# Patient Record
Sex: FEMALE | Race: WHITE | NOT HISPANIC OR LATINO | Employment: OTHER | URBAN - METROPOLITAN AREA
[De-identification: names, ages, dates, MRNs, and addresses within clinical notes are randomized per-mention and may not be internally consistent; named-entity substitution may affect disease eponyms.]

---

## 2017-02-27 ENCOUNTER — OFFICE VISIT (OUTPATIENT)
Dept: FAMILY MEDICINE CLINIC | Facility: CLINIC | Age: 57
End: 2017-02-27

## 2017-02-27 VITALS
DIASTOLIC BLOOD PRESSURE: 78 MMHG | HEIGHT: 64 IN | BODY MASS INDEX: 28.51 KG/M2 | SYSTOLIC BLOOD PRESSURE: 106 MMHG | TEMPERATURE: 98.2 F | OXYGEN SATURATION: 94 % | HEART RATE: 85 BPM | WEIGHT: 167 LBS

## 2017-02-27 DIAGNOSIS — J10.1 INFLUENZA A: Primary | ICD-10-CM

## 2017-02-27 DIAGNOSIS — H93.11 TINNITUS, RIGHT EAR: ICD-10-CM

## 2017-02-27 DIAGNOSIS — J34.81 NASAL MUCOSITIS (ULCERATIVE): ICD-10-CM

## 2017-02-27 LAB
EXPIRATION DATE: ABNORMAL
FLUAV AG NPH QL: POSITIVE
FLUBV AG NPH QL: NEGATIVE
INTERNAL CONTROL: ABNORMAL
Lab: ABNORMAL

## 2017-02-27 PROCEDURE — 87804 INFLUENZA ASSAY W/OPTIC: CPT | Performed by: FAMILY MEDICINE

## 2017-02-27 PROCEDURE — 99213 OFFICE O/P EST LOW 20 MIN: CPT | Performed by: FAMILY MEDICINE

## 2017-02-27 RX ORDER — OSELTAMIVIR PHOSPHATE 75 MG/1
75 CAPSULE ORAL 2 TIMES DAILY
Qty: 10 CAPSULE | Refills: 0 | Status: SHIPPED | OUTPATIENT
Start: 2017-02-27 | End: 2017-03-09

## 2017-02-27 RX ORDER — LEVOTHYROXINE SODIUM 0.1 MG/1
112 TABLET ORAL DAILY
COMMUNITY
End: 2017-11-29

## 2017-02-27 RX ORDER — BENZONATATE 100 MG/1
100 CAPSULE ORAL 3 TIMES DAILY PRN
Qty: 15 CAPSULE | Refills: 1 | Status: SHIPPED | OUTPATIENT
Start: 2017-02-27 | End: 2017-03-09

## 2017-02-27 NOTE — PROGRESS NOTES
"Subjective   Laxmi Mccartney is a 57 y.o. female.     Cough   This is a new problem. The current episode started in the past 7 days. The problem has been unchanged. The cough is productive of purulent sputum. Associated symptoms include chills, a fever (low grade), headaches, postnasal drip and shortness of breath (with exertion). Pertinent negatives include no myalgias or sore throat. The symptoms are aggravated by lying down. She has tried OTC cough suppressant for the symptoms.        The following portions of the patient's history were reviewed and updated as appropriate: allergies, current medications, past social history and problem list.    Review of Systems   Constitutional: Positive for chills and fever (low grade).   HENT: Positive for postnasal drip and tinnitus. Negative for sore throat.         Sore lesion in right nostril     Respiratory: Positive for cough and shortness of breath (with exertion).    Gastrointestinal: Negative for nausea.   Musculoskeletal: Negative for myalgias.   Neurological: Positive for headaches.       Objective   Visit Vitals   • /78   • Pulse 85   • Temp 98.2 °F (36.8 °C) (Oral)   • Ht 63.5\" (161.3 cm)   • Wt 167 lb (75.8 kg)   • SpO2 94%   • BMI 29.12 kg/m2     Physical Exam   Constitutional: She appears well-developed and well-nourished.   HENT:   Right Ear: External ear normal.   Left Ear: External ear normal.   Mouth/Throat: Oropharynx is clear and moist.   Right ear tube in place    Very superficial ulcer of the mucosa of the lateral aspect of the right nostril with some erythema.     Eyes: Pupils are equal, round, and reactive to light.   Neck: Normal range of motion. Neck supple.   Cardiovascular: Normal rate, regular rhythm and normal heart sounds.    No murmur heard.  Pulmonary/Chest: Effort normal and breath sounds normal. She has no wheezes. She has no rales.   Nursing note and vitals reviewed.      Assessment/Plan   Problem List Items Addressed This Visit     " None      Visit Diagnoses     Influenza A    -  Primary    Relevant Medications    mupirocin (BACTROBAN) 2 % ointment    oseltamivir (TAMIFLU) 75 MG capsule    benzonatate (TESSALON) 100 MG capsule    Tinnitus, right ear        Nasal mucositis (ulcerative)        right nostril                Rest and drink plenty fluids.    Rx for Tamiflu 75 mg twice a day for 5 days #10+0.  Rx for Benzonatate 100 mg three times a day as needed for cough #15+1.    Rx for Mupirocin ointment to apply twice a day #22 GM+0.    Follow up as needed.      Scribed for Dr Babatunde Kidd by Jyoti Herrera CMA.    I, Babatunde Kidd MD, personally performed the services described in this documentation, as scribed by Jyoti Herrera in my presence, and is both accurate and complete.

## 2017-03-09 ENCOUNTER — OFFICE VISIT (OUTPATIENT)
Dept: FAMILY MEDICINE CLINIC | Facility: CLINIC | Age: 57
End: 2017-03-09

## 2017-03-09 VITALS
HEIGHT: 64 IN | HEART RATE: 84 BPM | DIASTOLIC BLOOD PRESSURE: 70 MMHG | SYSTOLIC BLOOD PRESSURE: 110 MMHG | RESPIRATION RATE: 16 BRPM | BODY MASS INDEX: 28.68 KG/M2 | TEMPERATURE: 98.1 F | WEIGHT: 168 LBS

## 2017-03-09 DIAGNOSIS — J40 BRONCHITIS: Primary | ICD-10-CM

## 2017-03-09 PROCEDURE — 99213 OFFICE O/P EST LOW 20 MIN: CPT | Performed by: FAMILY MEDICINE

## 2017-03-09 RX ORDER — BENZONATATE 100 MG/1
100 CAPSULE ORAL 3 TIMES DAILY PRN
Qty: 20 CAPSULE | Refills: 1 | Status: ON HOLD | OUTPATIENT
Start: 2017-03-09 | End: 2017-12-01

## 2017-03-09 RX ORDER — AMOXICILLIN AND CLAVULANATE POTASSIUM 875; 125 MG/1; MG/1
1 TABLET, FILM COATED ORAL 2 TIMES DAILY
Qty: 14 TABLET | Refills: 0 | Status: SHIPPED | OUTPATIENT
Start: 2017-03-09 | End: 2017-03-19

## 2017-03-09 NOTE — PROGRESS NOTES
"Subjective   Laxmi Mccartney is a 57 y.o. female.     Cough   This is a recurrent problem. The current episode started in the past 7 days. The problem has been unchanged. The cough is productive of sputum (green yesterday, clear today). Associated symptoms include chills, shortness of breath and sweats. Pertinent negatives include no fever. The symptoms are aggravated by lying down. She has tried OTC cough suppressant for the symptoms. The treatment provided mild relief. Her past medical history is significant for bronchitis. There is no history of asthma.        The following portions of the patient's history were reviewed and updated as appropriate: allergies, current medications, past social history and problem list.    Review of Systems   Constitutional: Positive for chills. Negative for fever.   Respiratory: Positive for cough and shortness of breath.        Objective   Visit Vitals   • /70   • Pulse 84   • Temp 98.1 °F (36.7 °C) (Oral)   • Resp 16   • Ht 63.5\" (161.3 cm)   • Wt 168 lb (76.2 kg)   • BMI 29.29 kg/m2     Physical Exam   Constitutional: She appears well-developed and well-nourished.   Cardiovascular: Normal rate, regular rhythm and normal heart sounds.    Pulmonary/Chest: Effort normal and breath sounds normal.   Nursing note and vitals reviewed.      Assessment/Plan   Problem List Items Addressed This Visit     None      Visit Diagnoses     Bronchitis    -  Primary              Rest and drink plenty fluids.    Rx for Augmentin 875 mg twice a day for 7 days.    Rx for Benzonatate 100 mg three times a day as needed #20+1.    Follow up as needed.    Scribed for Dr Babatunde Kidd by Jyoti eHrrera CMA.    I, Babatunde Kidd MD, personally performed the services described in this documentation, as scribed by Jyoti Herrera in my presence, and is both accurate and complete.    "

## 2017-11-22 ENCOUNTER — APPOINTMENT (OUTPATIENT)
Dept: GENERAL RADIOLOGY | Facility: HOSPITAL | Age: 57
End: 2017-11-22

## 2017-11-22 ENCOUNTER — APPOINTMENT (OUTPATIENT)
Dept: CT IMAGING | Facility: HOSPITAL | Age: 57
End: 2017-11-22

## 2017-11-22 ENCOUNTER — HOSPITAL ENCOUNTER (EMERGENCY)
Facility: HOSPITAL | Age: 57
Discharge: HOME | End: 2017-11-23
Attending: EMERGENCY MEDICINE | Admitting: EMERGENCY MEDICINE

## 2017-11-22 DIAGNOSIS — S80.01XA CONTUSION OF RIGHT KNEE, INITIAL ENCOUNTER: ICD-10-CM
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
## 2017-11-22 DIAGNOSIS — S82.142A TIBIAL PLATEAU FRACTURE, LEFT, CLOSED, INITIAL ENCOUNTER: Primary | ICD-10-CM

## 2017-11-22 PROCEDURE — 73700 CT LOWER EXTREMITY W/O DYE: CPT

## 2017-11-22 PROCEDURE — 73560 X-RAY EXAM OF KNEE 1 OR 2: CPT

## 2017-11-22 PROCEDURE — 99283 EMERGENCY DEPT VISIT LOW MDM: CPT

## 2017-11-22 PROCEDURE — 73590 X-RAY EXAM OF LOWER LEG: CPT

## 2017-11-22 RX ORDER — OXYCODONE AND ACETAMINOPHEN 7.5; 325 MG/1; MG/1
1 TABLET ORAL ONCE AS NEEDED
Status: COMPLETED | OUTPATIENT
Start: 2017-11-22 | End: 2017-11-22

## 2017-11-22 RX ORDER — NAPROXEN 500 MG/1
500 TABLET ORAL 2 TIMES DAILY PRN
Qty: 12 TABLET | Refills: 0 | Status: SHIPPED | OUTPATIENT
Start: 2017-11-22 | End: 2020-06-01

## 2017-11-22 RX ORDER — HYDROCODONE BITARTRATE AND ACETAMINOPHEN 5; 325 MG/1; MG/1
1-2 TABLET ORAL EVERY 6 HOURS PRN
Qty: 20 TABLET | Refills: 0 | Status: SHIPPED | OUTPATIENT
Start: 2017-11-22 | End: 2018-04-11

## 2017-11-22 RX ORDER — CYCLOBENZAPRINE HCL 10 MG
10 TABLET ORAL 3 TIMES DAILY PRN
Qty: 12 TABLET | Refills: 0 | Status: SHIPPED | OUTPATIENT
Start: 2017-11-22 | End: 2020-06-01

## 2017-11-22 RX ADMIN — OXYCODONE HYDROCHLORIDE AND ACETAMINOPHEN 1 TABLET: 7.5; 325 TABLET ORAL at 21:54

## 2017-11-23 VITALS
HEIGHT: 63 IN | OXYGEN SATURATION: 99 % | RESPIRATION RATE: 16 BRPM | HEART RATE: 65 BPM | TEMPERATURE: 98.5 F | DIASTOLIC BLOOD PRESSURE: 79 MMHG | SYSTOLIC BLOOD PRESSURE: 134 MMHG | WEIGHT: 163 LBS | BODY MASS INDEX: 28.88 KG/M2

## 2017-11-23 NOTE — ED PROVIDER NOTES
Subjective   HPI Comments: Laxmi Mccartney is a 57 y.o.female who presents to the ED with complaints of a knee injury with onset today. The patient states that she was moving a heavy sofa when her foot got caught on a carpet, which caused her to fall. She states that the furniture landed on her knee, but was not entrapped by the fall. She notes that she can not walk and reports bilateral swelling in her knees. She also notes that she did hit her head, but did not experience LOC, and does not report a headache, N/V, or AMS. There are no other known complaints at this time.         Patient is a 57 y.o. female presenting with lower extremity pain.   History provided by:  Patient  Lower Extremity Issue   Location:  Knee  Injury: yes    Mechanism of injury: fall    Fall:     Fall occurred:  Walking    Point of impact:  Knees    Entrapped after fall: no    Knee location:  L knee and R knee  Pain details:     Quality:  Aching    Radiates to:  Does not radiate    Onset quality:  Sudden    Timing:  Constant  Chronicity:  New  Dislocation: no    Foreign body present:  No foreign bodies  Tetanus status:  Unknown  Relieved by:  None tried  Worsened by:  Nothing  Ineffective treatments:  None tried  Associated symptoms: swelling        Review of Systems   Musculoskeletal: Positive for arthralgias (Knee pain and swelling).   Neurological: Negative for headaches.   All other systems reviewed and are negative.      Past Medical History:   Diagnosis Date   • Abdominal pain    • Acute serous otitis media    • Acute sinusitis    • Acute suppurative otitis media    • Bronchitis    • Congestion and hemorrhage of prostate    • Cough    • Eustachian tube dysfunction     BRITTANY    • Exogenous obesity    • Fatigue    • Gastroenteritis    • Influenza    • Sneezing    • Vaginitis        No Known Allergies    Past Surgical History:   Procedure Laterality Date   • COLONOSCOPY  2009    repeat 5 yrs a       Family History   Problem Relation Age of  Onset   • Rheum arthritis Mother    • Aortic aneurysm Father    • Pernicious anemia Father    • Aortic aneurysm Sister    • Colon cancer Paternal Aunt    • Pernicious anemia Paternal Aunt        Social History     Social History   • Marital status: Single     Spouse name: N/A   • Number of children: N/A   • Years of education: N/A     Social History Main Topics   • Smoking status: Never Smoker   • Smokeless tobacco: Never Used   • Alcohol use Yes      Comment: socially   • Drug use: No   • Sexual activity: Not Asked     Other Topics Concern   • None     Social History Narrative         Objective   Physical Exam   Constitutional: She is oriented to person, place, and time. She appears well-developed and well-nourished.   HENT:   Head: Normocephalic and atraumatic.   Nose: Nose normal.   Eyes: Conjunctivae are normal. No scleral icterus.   Neck: Normal range of motion. Neck supple.   Cardiovascular: Normal rate and regular rhythm.    Pulmonary/Chest: Effort normal and breath sounds normal. No respiratory distress.   Abdominal: Soft. There is no tenderness.   Musculoskeletal: Normal range of motion. She exhibits edema and tenderness.   Right knee is swollen and tender with a mild abrasion on the anterior aspect of the knee. Mild swelling and early contusion medial to the knee in the lateral proximal aspect of the knee.    Neurological: She is alert and oriented to person, place, and time.   Neurologically intact.    Skin: Skin is warm and dry.   Psychiatric: She has a normal mood and affect.   Nursing note and vitals reviewed.      Procedures         ED Course  ED Course   Comment By Time   I have spoken with Dr. Hutchins, of Orthopedics, who recommends a knee immobilizer, non weight bearing for the next couple of days, and crutches. He also states that he would like a CT scan prior to discharge for the appointment with him next week. -DIANA Bourgeois 11/22 2996     No results found for this or any previous visit (from  "the past 24 hour(s)).  Note: In addition to lab results from this visit, the labs listed above may include labs taken at another facility or during a different encounter within the last 24 hours. Please correlate lab times with ED admission and discharge times for further clarification of the services performed during this visit.    CT Lower Extremity Left Without Contrast   Final Result     Significantly comminuted and depressed left lateral tibial plateau fracture.     Details, as above.      THIS DOCUMENT HAS BEEN ELECTRONICALLY SIGNED BY JHOANA WALL MD      XR Tibia Fibula 2 View Left   Final Result     Mildly displaced fracture through the lateral aspect of the left lateral    tibial plateau.      THIS DOCUMENT HAS BEEN ELECTRONICALLY SIGNED BY JHOANA WALL MD      XR Knee 1 or 2 View Right   Final Result     No acute fracture identified.      THIS DOCUMENT HAS BEEN ELECTRONICALLY SIGNED BY JHOANA WALL MD      XR Knee 1 or 2 View Left   Final Result     Mildly depressed fracture through the lateral aspect of the left lateral    tibial plateau.      THIS DOCUMENT HAS BEEN ELECTRONICALLY SIGNED BY JHOANA AWLL MD        Vitals:    11/22/17 2116   BP: 141/81   BP Location: Left arm   Patient Position: Sitting   Pulse: 61   Resp: 16   Temp: 98.5 °F (36.9 °C)   TempSrc: Oral   SpO2: 98%   Weight: 163 lb (73.9 kg)   Height: 63\" (160 cm)     Medications   oxyCODONE-acetaminophen (PERCOCET) 7.5-325 MG per tablet 1 tablet (1 tablet Oral Given 11/22/17 2154)     ECG/EMG Results (last 24 hours)     ** No results found for the last 24 hours. **                        [facility]    Final diagnoses:   Tibial plateau fracture, left, closed, initial encounter   Contusion of right knee, initial encounter   Fall, initial encounter       Documentation assistance provided by asaf Bourgeois.  Information recorded by the asaf was done at my direction and has been verified and validated by me.     Gordy Bourgeois  11/22/17 " 2206       Gordy Bourgeois  11/22/17 2233       Gordy Bourgeois  11/23/17 0008       Papi Quintanilla DO  11/25/17 1214

## 2017-11-23 NOTE — DISCHARGE INSTRUCTIONS
It is important that you follow up with Dr. Hutchins, orthopedics, in his office as soon as possible next week.  It is also important to keep the leg elevated, do not bear any weight on the affected extremity, and apply ice as directed and your discharge instructions.  Should you be unable to control your pain despite taking the pain medication as prescribed he should return to the emergency department for reevaluation.

## 2017-11-28 ENCOUNTER — PREP FOR SURGERY (OUTPATIENT)
Dept: OTHER | Facility: HOSPITAL | Age: 57
End: 2017-11-28

## 2017-11-28 DIAGNOSIS — S82.142A CLOSED FRACTURE OF LEFT TIBIAL PLATEAU, INITIAL ENCOUNTER: Primary | ICD-10-CM

## 2017-11-28 RX ORDER — CEFAZOLIN SODIUM 2 G/100ML
2 INJECTION, SOLUTION INTRAVENOUS ONCE
Status: CANCELLED | OUTPATIENT
Start: 2017-11-28 | End: 2017-11-28

## 2017-11-29 ENCOUNTER — OFFICE VISIT (OUTPATIENT)
Dept: ORTHOPEDIC SURGERY | Facility: CLINIC | Age: 57
End: 2017-11-29

## 2017-11-29 VITALS
HEIGHT: 63 IN | WEIGHT: 163 LBS | DIASTOLIC BLOOD PRESSURE: 81 MMHG | BODY MASS INDEX: 28.88 KG/M2 | SYSTOLIC BLOOD PRESSURE: 129 MMHG | HEART RATE: 65 BPM

## 2017-11-29 DIAGNOSIS — S82.142A CLOSED FRACTURE OF LEFT TIBIAL PLATEAU, INITIAL ENCOUNTER: Primary | ICD-10-CM

## 2017-11-29 PROCEDURE — 99203 OFFICE O/P NEW LOW 30 MIN: CPT | Performed by: ORTHOPAEDIC SURGERY

## 2017-11-29 RX ORDER — LEVOTHYROXINE SODIUM 112 MCG
TABLET ORAL
COMMUNITY
Start: 2017-11-05 | End: 2023-04-06

## 2017-11-29 NOTE — PROGRESS NOTES
[facility] [place] [facility] Note    Subjective     Chief Complaint   Patient presents with   • Left Tibia - Pain     Closed fracture of left tibial plateau        HPI    Laxmi Mccartney is a 57 y.o. female. She presents today for evaluation of left knee pain.  She injured her knee on 11/22/2017, when she was lifting a couch at home with her nephew.  She lost balance, and the sofa fell on her leg, and she had the onset of pain and inability to bear weight afterwards on her left lower extremity.  The pain is mild at current time, so she was swelling and stiffness.  It worsens with attempts at walking.  No previous history of trauma to the knee.  She was seen in the emergency room, where she was diagnosed with a tibial plateau fracture, and placed into a knee immobilizer and referred here for further care and treatment.  CT scan was also obtained.      Patient Active Problem List   Diagnosis   • Closed fracture of left tibial plateau     Past Medical History:   Diagnosis Date   • Abdominal pain    • Acute serous otitis media    • Acute sinusitis    • Acute suppurative otitis media    • Bronchitis    • Congestion and hemorrhage of prostate    • Cough    • Eustachian tube dysfunction     BRITTANY    • Exogenous obesity    • Fatigue    • Gastroenteritis    • Influenza    • Sneezing    • Vaginitis       Past Surgical History:   Procedure Laterality Date   • COLONOSCOPY  2009    repeat 5 yrs a      Family History   Problem Relation Age of Onset   • Rheum arthritis Mother    • Aortic aneurysm Father    • Pernicious anemia Father    • Aortic aneurysm Sister    • Colon cancer Paternal Aunt    • Pernicious anemia Paternal Aunt      Social History     Social History   • Marital status: Single     Spouse name: N/A   • Number of children: N/A   • Years of education: N/A     Occupational History   • Not on file.     Social History Main Topics   • Smoking status: Never Smoker   • Smokeless tobacco: Never Used   • Alcohol use Yes  "     Comment: socially   • Drug use: No   • Sexual activity: Defer     Other Topics Concern   • Not on file     Social History Narrative      Current Outpatient Prescriptions on File Prior to Visit   Medication Sig Dispense Refill   • benzonatate (TESSALON PERLES) 100 MG capsule Take 1 capsule by mouth 3 (Three) Times a Day As Needed for cough. 20 capsule 1   • cyclobenzaprine (FLEXERIL) 10 MG tablet Take 1 tablet by mouth 3 (Three) Times a Day As Needed for Muscle Spasms. 12 tablet 0   • HYDROcodone-acetaminophen (NORCO) 5-325 MG per tablet Take 1-2 tablets by mouth Every 6 (Six) Hours As Needed for Severe Pain . 20 tablet 0   • mupirocin (BACTROBAN) 2 % ointment Apply  topically 2 (Two) Times a Day. 22 g 0   • naproxen (NAPROSYN) 500 MG tablet Take 1 tablet by mouth 2 (Two) Times a Day As Needed for Moderate Pain . 12 tablet 0   • [DISCONTINUED] levothyroxine (SYNTHROID, LEVOTHROID) 100 MCG tablet Take 112 mcg by mouth Daily.       No current facility-administered medications on file prior to visit.       No Known Allergies     Review of Systems   Constitutional: Negative.    HENT: Positive for mouth sores and tinnitus.    Eyes: Negative.    Respiratory: Negative.    Cardiovascular: Negative.    Gastrointestinal: Negative.    Endocrine: Negative.    Genitourinary: Negative.    Musculoskeletal: Positive for arthralgias.   Skin: Negative.    Allergic/Immunologic: Negative.    Neurological: Negative.    Hematological: Negative.    Psychiatric/Behavioral: Negative.         Objective      Physical Exam  /81  Pulse 65  Ht 63\" (160 cm)  Wt 163 lb (73.9 kg)  BMI 28.87 kg/m2    Body mass index is 28.87 kg/(m^2).    General:   Mental Status:  Alert   Appearance: Cooperative, in no acute distress   Build and Nutrition: Well-nourished and well developed female   Orientation: Alert and oriented to person, place and time   Posture: Normal    Integument:   Left knee: Skin is intact, with mild " swelling    Neurologic:   Sensation:    Left foot: Intact to light touch on the dorsal and plantar aspect   Motor:  Left lower extremity: 5/5 quadriceps, hamstrings, ankle dorsiflexors, and ankle plantar flexors  Vascular:   Left lower extremity: 2+ dorsalis pedis pulse, prompt capillary refill    Lower Extremities:   Left Knee:    Tenderness:  Lateral tenderness    Effusion:  1+    Swelling:  Positive    Crepitus:  None    Atrophy:  None    Range of motion:  Extension: 0°       Flexion: 60°  Instability:  Instability, with increased valgus with stress, however the medial collateral ligament appears to be intact  Deformities:  None      Imaging/Studies  Outside radiographs are obtained, which showed a lateral tibial plateau fracture.  CT scan was also reviewed, which showed a depression-type lateral to plateau fracture, with significant joint impaction with approximately 12-13 mm of joint impaction.      Assessment and Plan     Laxmi was seen today for pain.    Diagnoses and all orders for this visit:    Closed fracture of left tibial plateau, initial encounter        I reviewed my findings with patient today.  She has a significant lateral tibial plateau fracture, and is otherwise healthy.  She does have instability, and would be best served by operative intervention to restore the integrity of the joint as best can be done given the degree of comminution and impaction.  She understands her risks, benefits, and alternatives to the procedure.  She also understands risk of posttraumatic arthritis in the future, with possible conversion to a total knee arthroplasty in the future if appropriate.  Risks, benefits, and alternatives to the procedure were discussed.  Risks discussed included, but are not limited to: Bleeding, infection, damage to blood vessels and nerves, posttraumatic arthritis, malunion, nonunion, deep venous thrombosis, pulmonary embolus, death, and anesthetic complications.  She understands,  consents, and her questions were answered.  We'll plan for surgery later this week as long as his soft tissues allow, and certainly she has only minimal swelling at current time.  She will continue with ice, nonweightbearing, and relative rest for now.  I will see her for surgery later this week.    Return for For surgery as planned.      Medical Decision Making  Management Options : major surgery with risk factors  Data/Risk: independent visualization of imaging, lab tests, or EMG/NCV      Ger Hutchins MD  11/29/17  7:26 PM

## 2017-12-01 ENCOUNTER — ANESTHESIA EVENT (OUTPATIENT)
Dept: PERIOP | Facility: HOSPITAL | Age: 57
End: 2017-12-01

## 2017-12-01 ENCOUNTER — APPOINTMENT (OUTPATIENT)
Dept: GENERAL RADIOLOGY | Facility: HOSPITAL | Age: 57
End: 2017-12-01

## 2017-12-01 ENCOUNTER — ANESTHESIA (OUTPATIENT)
Dept: PERIOP | Facility: HOSPITAL | Age: 57
End: 2017-12-01

## 2017-12-01 ENCOUNTER — HOSPITAL ENCOUNTER (OUTPATIENT)
Facility: HOSPITAL | Age: 57
Setting detail: OBSERVATION
Discharge: HOME | End: 2017-12-02
Attending: ORTHOPAEDIC SURGERY | Admitting: ORTHOPAEDIC SURGERY

## 2017-12-01 DIAGNOSIS — Z74.09 IMPAIRED MOBILITY AND ADLS: Primary | ICD-10-CM

## 2017-12-01 DIAGNOSIS — S82.142A CLOSED FRACTURE OF LEFT TIBIAL PLATEAU, INITIAL ENCOUNTER: ICD-10-CM

## 2017-12-01 DIAGNOSIS — Z78.9 IMPAIRED MOBILITY AND ADLS: Primary | ICD-10-CM

## 2017-12-01 DIAGNOSIS — Z74.09 IMPAIRED FUNCTIONAL MOBILITY, BALANCE, GAIT, AND ENDURANCE: ICD-10-CM

## 2017-12-01 PROBLEM — E03.9 HYPOTHYROID: Status: ACTIVE | Noted: 2017-12-01

## 2017-12-01 PROBLEM — Z98.890 S/P ORIF (OPEN REDUCTION INTERNAL FIXATION) FRACTURE: Status: ACTIVE | Noted: 2017-12-01

## 2017-12-01 PROBLEM — Z87.81 S/P ORIF (OPEN REDUCTION INTERNAL FIXATION) FRACTURE: Status: ACTIVE | Noted: 2017-12-01

## 2017-12-01 LAB
ANION GAP SERPL CALCULATED.3IONS-SCNC: 6 MMOL/L (ref 3–11)
BASOPHILS # BLD AUTO: 0.06 10*3/MM3 (ref 0–0.2)
BASOPHILS NFR BLD AUTO: 1 % (ref 0–1)
BUN BLD-MCNC: 13 MG/DL (ref 9–23)
BUN/CREAT SERPL: 14.4 (ref 7–25)
CALCIUM SPEC-SCNC: 9.1 MG/DL (ref 8.7–10.4)
CHLORIDE SERPL-SCNC: 107 MMOL/L (ref 99–109)
CO2 SERPL-SCNC: 27 MMOL/L (ref 20–31)
CREAT BLD-MCNC: 0.9 MG/DL (ref 0.6–1.3)
DEPRECATED RDW RBC AUTO: 43.5 FL (ref 37–54)
EOSINOPHIL # BLD AUTO: 0.26 10*3/MM3 (ref 0–0.3)
EOSINOPHIL NFR BLD AUTO: 4.3 % (ref 0–3)
ERYTHROCYTE [DISTWIDTH] IN BLOOD BY AUTOMATED COUNT: 15 % (ref 11.3–14.5)
GFR SERPL CREATININE-BSD FRML MDRD: 64 ML/MIN/1.73
GLUCOSE BLD-MCNC: 110 MG/DL (ref 70–100)
HCT VFR BLD AUTO: 36.5 % (ref 34.5–44)
HGB BLD-MCNC: 11.4 G/DL (ref 11.5–15.5)
IMM GRANULOCYTES # BLD: 0.02 10*3/MM3 (ref 0–0.03)
IMM GRANULOCYTES NFR BLD: 0.3 % (ref 0–0.6)
LYMPHOCYTES # BLD AUTO: 1.75 10*3/MM3 (ref 0.6–4.8)
LYMPHOCYTES NFR BLD AUTO: 29 % (ref 24–44)
MCH RBC QN AUTO: 24.7 PG (ref 27–31)
MCHC RBC AUTO-ENTMCNC: 31.2 G/DL (ref 32–36)
MCV RBC AUTO: 79.2 FL (ref 80–99)
MONOCYTES # BLD AUTO: 0.67 10*3/MM3 (ref 0–1)
MONOCYTES NFR BLD AUTO: 11.1 % (ref 0–12)
NEUTROPHILS # BLD AUTO: 3.28 10*3/MM3 (ref 1.5–8.3)
NEUTROPHILS NFR BLD AUTO: 54.3 % (ref 41–71)
PLATELET # BLD AUTO: 322 10*3/MM3 (ref 150–450)
PMV BLD AUTO: 9 FL (ref 6–12)
POTASSIUM BLD-SCNC: 3.9 MMOL/L (ref 3.5–5.5)
RBC # BLD AUTO: 4.61 10*6/MM3 (ref 3.89–5.14)
SODIUM BLD-SCNC: 140 MMOL/L (ref 132–146)
WBC NRBC COR # BLD: 6.04 10*3/MM3 (ref 3.5–10.8)

## 2017-12-01 PROCEDURE — G0378 HOSPITAL OBSERVATION PER HR: HCPCS

## 2017-12-01 PROCEDURE — 25010000002 DEXAMETHASONE PER 1 MG: Performed by: NURSE ANESTHETIST, CERTIFIED REGISTERED

## 2017-12-01 PROCEDURE — C1713 ANCHOR/SCREW BN/BN,TIS/BN: HCPCS | Performed by: ORTHOPAEDIC SURGERY

## 2017-12-01 PROCEDURE — 25010000002 ONDANSETRON PER 1 MG: Performed by: NURSE ANESTHETIST, CERTIFIED REGISTERED

## 2017-12-01 PROCEDURE — 85025 COMPLETE CBC W/AUTO DIFF WBC: CPT | Performed by: ORTHOPAEDIC SURGERY

## 2017-12-01 PROCEDURE — 25010000002 FENTANYL CITRATE (PF) 100 MCG/2ML SOLUTION: Performed by: NURSE ANESTHETIST, CERTIFIED REGISTERED

## 2017-12-01 PROCEDURE — 76000 FLUOROSCOPY <1 HR PHYS/QHP: CPT

## 2017-12-01 PROCEDURE — 27536 TREAT KNEE FRACTURE: CPT | Performed by: ORTHOPAEDIC SURGERY

## 2017-12-01 PROCEDURE — 73560 X-RAY EXAM OF KNEE 1 OR 2: CPT

## 2017-12-01 PROCEDURE — 80048 BASIC METABOLIC PNL TOTAL CA: CPT | Performed by: ORTHOPAEDIC SURGERY

## 2017-12-01 PROCEDURE — 25010000002 HYDROMORPHONE PER 4 MG: Performed by: NURSE ANESTHETIST, CERTIFIED REGISTERED

## 2017-12-01 PROCEDURE — 25010000002 PROPOFOL 10 MG/ML EMULSION: Performed by: NURSE ANESTHETIST, CERTIFIED REGISTERED

## 2017-12-01 PROCEDURE — 76001 HC FLUORO GREATER THAN 1 HOUR: CPT

## 2017-12-01 PROCEDURE — L1833 KO ADJ JNT POS R SUP PRE OTS: HCPCS | Performed by: ORTHOPAEDIC SURGERY

## 2017-12-01 PROCEDURE — 25010000003 CEFAZOLIN IN DEXTROSE 2-4 GM/100ML-% SOLUTION: Performed by: ORTHOPAEDIC SURGERY

## 2017-12-01 DEVICE — IMPLANTABLE DEVICE: Type: IMPLANTABLE DEVICE | Site: TIBIA | Status: FUNCTIONAL

## 2017-12-01 DEVICE — SCRW LK VA/LCP S/TAP STRDRV 3.5X65MM: Type: IMPLANTABLE DEVICE | Site: TIBIA | Status: FUNCTIONAL

## 2017-12-01 DEVICE — SCRW CORT S/TAP 3.5X34MM: Type: IMPLANTABLE DEVICE | Site: TIBIA | Status: FUNCTIONAL

## 2017-12-01 DEVICE — SCRW LK VA/LCP S/TAP STRDRV 3.5X60MM: Type: IMPLANTABLE DEVICE | Site: TIBIA | Status: FUNCTIONAL

## 2017-12-01 DEVICE — PLT TIB VA/LCP PROX SMBND SS 4H 3.5X87 LT: Type: IMPLANTABLE DEVICE | Site: TIBIA | Status: FUNCTIONAL

## 2017-12-01 DEVICE — ALLOGRFT BONE VIVIGEN CELLUAR MATRX FORMABLE 5CC: Type: IMPLANTABLE DEVICE | Site: TIBIA | Status: FUNCTIONAL

## 2017-12-01 DEVICE — SCRW CORT S/TAP 3.5X40MM: Type: IMPLANTABLE DEVICE | Site: TIBIA | Status: FUNCTIONAL

## 2017-12-01 RX ORDER — FAMOTIDINE 10 MG/ML
20 INJECTION, SOLUTION INTRAVENOUS ONCE
Status: CANCELLED | OUTPATIENT
Start: 2017-12-01 | End: 2017-12-01

## 2017-12-01 RX ORDER — DIPHENHYDRAMINE HCL 25 MG
25 CAPSULE ORAL EVERY 6 HOURS PRN
Status: DISCONTINUED | OUTPATIENT
Start: 2017-12-01 | End: 2017-12-02 | Stop reason: HOSPADM

## 2017-12-01 RX ORDER — PROMETHAZINE HYDROCHLORIDE 25 MG/ML
6.25 INJECTION, SOLUTION INTRAMUSCULAR; INTRAVENOUS ONCE AS NEEDED
Status: DISCONTINUED | OUTPATIENT
Start: 2017-12-01 | End: 2017-12-01 | Stop reason: HOSPADM

## 2017-12-01 RX ORDER — HYDROMORPHONE HYDROCHLORIDE 1 MG/ML
0.5 INJECTION, SOLUTION INTRAMUSCULAR; INTRAVENOUS; SUBCUTANEOUS
Status: COMPLETED | OUTPATIENT
Start: 2017-12-01 | End: 2017-12-01

## 2017-12-01 RX ORDER — ONDANSETRON 4 MG/1
4 TABLET, FILM COATED ORAL EVERY 6 HOURS PRN
Status: DISCONTINUED | OUTPATIENT
Start: 2017-12-01 | End: 2017-12-02 | Stop reason: HOSPADM

## 2017-12-01 RX ORDER — NALOXONE HCL 0.4 MG/ML
0.1 VIAL (ML) INJECTION
Status: DISCONTINUED | OUTPATIENT
Start: 2017-12-01 | End: 2017-12-02 | Stop reason: HOSPADM

## 2017-12-01 RX ORDER — SODIUM CHLORIDE, SODIUM LACTATE, POTASSIUM CHLORIDE, CALCIUM CHLORIDE 600; 310; 30; 20 MG/100ML; MG/100ML; MG/100ML; MG/100ML
9 INJECTION, SOLUTION INTRAVENOUS CONTINUOUS
Status: DISCONTINUED | OUTPATIENT
Start: 2017-12-01 | End: 2017-12-01

## 2017-12-01 RX ORDER — CEFAZOLIN SODIUM 2 G/100ML
2 INJECTION, SOLUTION INTRAVENOUS ONCE
Status: COMPLETED | OUTPATIENT
Start: 2017-12-01 | End: 2017-12-01

## 2017-12-01 RX ORDER — ONDANSETRON 2 MG/ML
INJECTION INTRAMUSCULAR; INTRAVENOUS AS NEEDED
Status: DISCONTINUED | OUTPATIENT
Start: 2017-12-01 | End: 2017-12-01 | Stop reason: SURG

## 2017-12-01 RX ORDER — PHENOL 1.4 %
600 AEROSOL, SPRAY (ML) MUCOUS MEMBRANE DAILY
COMMUNITY

## 2017-12-01 RX ORDER — HYDROMORPHONE HYDROCHLORIDE 1 MG/ML
0.5 INJECTION, SOLUTION INTRAMUSCULAR; INTRAVENOUS; SUBCUTANEOUS
Status: DISCONTINUED | OUTPATIENT
Start: 2017-12-01 | End: 2017-12-02 | Stop reason: HOSPADM

## 2017-12-01 RX ORDER — FAMOTIDINE 20 MG/1
20 TABLET, FILM COATED ORAL ONCE
Status: COMPLETED | OUTPATIENT
Start: 2017-12-01 | End: 2017-12-01

## 2017-12-01 RX ORDER — PROPOFOL 10 MG/ML
VIAL (ML) INTRAVENOUS CONTINUOUS PRN
Status: DISCONTINUED | OUTPATIENT
Start: 2017-12-01 | End: 2017-12-01 | Stop reason: SURG

## 2017-12-01 RX ORDER — HYDROCODONE BITARTRATE AND ACETAMINOPHEN 7.5; 325 MG/1; MG/1
1 TABLET ORAL EVERY 4 HOURS PRN
Status: DISCONTINUED | OUTPATIENT
Start: 2017-12-01 | End: 2017-12-02 | Stop reason: HOSPADM

## 2017-12-01 RX ORDER — CYCLOBENZAPRINE HCL 10 MG
10 TABLET ORAL 3 TIMES DAILY PRN
Status: DISCONTINUED | OUTPATIENT
Start: 2017-12-01 | End: 2017-12-02 | Stop reason: HOSPADM

## 2017-12-01 RX ORDER — PROPOFOL 10 MG/ML
VIAL (ML) INTRAVENOUS AS NEEDED
Status: DISCONTINUED | OUTPATIENT
Start: 2017-12-01 | End: 2017-12-01 | Stop reason: SURG

## 2017-12-01 RX ORDER — ACETAMINOPHEN 325 MG/1
650 TABLET ORAL ONCE
Status: COMPLETED | OUTPATIENT
Start: 2017-12-01 | End: 2017-12-01

## 2017-12-01 RX ORDER — SODIUM CHLORIDE 0.9 % (FLUSH) 0.9 %
1-10 SYRINGE (ML) INJECTION AS NEEDED
Status: DISCONTINUED | OUTPATIENT
Start: 2017-12-01 | End: 2017-12-02 | Stop reason: HOSPADM

## 2017-12-01 RX ORDER — EPHEDRINE SULFATE 50 MG/ML
5 INJECTION, SOLUTION INTRAVENOUS ONCE AS NEEDED
Status: DISCONTINUED | OUTPATIENT
Start: 2017-12-01 | End: 2017-12-01 | Stop reason: HOSPADM

## 2017-12-01 RX ORDER — SODIUM CHLORIDE 0.9 % (FLUSH) 0.9 %
1-10 SYRINGE (ML) INJECTION AS NEEDED
Status: DISCONTINUED | OUTPATIENT
Start: 2017-12-01 | End: 2017-12-01 | Stop reason: HOSPADM

## 2017-12-01 RX ORDER — ATRACURIUM BESYLATE 10 MG/ML
INJECTION, SOLUTION INTRAVENOUS AS NEEDED
Status: DISCONTINUED | OUTPATIENT
Start: 2017-12-01 | End: 2017-12-01 | Stop reason: SURG

## 2017-12-01 RX ORDER — BISACODYL 5 MG/1
10 TABLET, DELAYED RELEASE ORAL DAILY PRN
Status: DISCONTINUED | OUTPATIENT
Start: 2017-12-01 | End: 2017-12-02 | Stop reason: HOSPADM

## 2017-12-01 RX ORDER — NALOXONE HCL 0.4 MG/ML
0.4 VIAL (ML) INJECTION
Status: DISCONTINUED | OUTPATIENT
Start: 2017-12-01 | End: 2017-12-02 | Stop reason: HOSPADM

## 2017-12-01 RX ORDER — DOCUSATE SODIUM 100 MG/1
100 CAPSULE, LIQUID FILLED ORAL 2 TIMES DAILY PRN
Status: DISCONTINUED | OUTPATIENT
Start: 2017-12-01 | End: 2017-12-02 | Stop reason: HOSPADM

## 2017-12-01 RX ORDER — MELOXICAM 7.5 MG/1
15 TABLET ORAL ONCE
Status: COMPLETED | OUTPATIENT
Start: 2017-12-01 | End: 2017-12-01

## 2017-12-01 RX ORDER — BISACODYL 10 MG
10 SUPPOSITORY, RECTAL RECTAL DAILY PRN
Status: DISCONTINUED | OUTPATIENT
Start: 2017-12-01 | End: 2017-12-02 | Stop reason: HOSPADM

## 2017-12-01 RX ORDER — OXYCODONE HYDROCHLORIDE AND ACETAMINOPHEN 5; 325 MG/1; MG/1
1 TABLET ORAL ONCE
Status: COMPLETED | OUTPATIENT
Start: 2017-12-01 | End: 2017-12-01

## 2017-12-01 RX ORDER — FENTANYL CITRATE 50 UG/ML
50 INJECTION, SOLUTION INTRAMUSCULAR; INTRAVENOUS
Status: DISCONTINUED | OUTPATIENT
Start: 2017-12-01 | End: 2017-12-01 | Stop reason: HOSPADM

## 2017-12-01 RX ORDER — MORPHINE SULFATE 2 MG/ML
4 INJECTION, SOLUTION INTRAMUSCULAR; INTRAVENOUS
Status: DISCONTINUED | OUTPATIENT
Start: 2017-12-01 | End: 2017-12-02 | Stop reason: HOSPADM

## 2017-12-01 RX ORDER — LIDOCAINE HYDROCHLORIDE 10 MG/ML
INJECTION, SOLUTION INFILTRATION; PERINEURAL AS NEEDED
Status: DISCONTINUED | OUTPATIENT
Start: 2017-12-01 | End: 2017-12-01 | Stop reason: SURG

## 2017-12-01 RX ORDER — MELOXICAM 7.5 MG/1
15 TABLET ORAL DAILY
Status: DISCONTINUED | OUTPATIENT
Start: 2017-12-01 | End: 2017-12-02 | Stop reason: HOSPADM

## 2017-12-01 RX ORDER — LIDOCAINE HYDROCHLORIDE 10 MG/ML
0.5 INJECTION, SOLUTION EPIDURAL; INFILTRATION; INTRACAUDAL; PERINEURAL ONCE AS NEEDED
Status: COMPLETED | OUTPATIENT
Start: 2017-12-01 | End: 2017-12-01

## 2017-12-01 RX ORDER — ASPIRIN 325 MG
325 TABLET, DELAYED RELEASE (ENTERIC COATED) ORAL DAILY
Status: DISCONTINUED | OUTPATIENT
Start: 2017-12-02 | End: 2017-12-02 | Stop reason: HOSPADM

## 2017-12-01 RX ORDER — SODIUM CHLORIDE 9 MG/ML
120 INJECTION, SOLUTION INTRAVENOUS CONTINUOUS
Status: DISCONTINUED | OUTPATIENT
Start: 2017-12-01 | End: 2017-12-02 | Stop reason: HOSPADM

## 2017-12-01 RX ORDER — PROMETHAZINE HYDROCHLORIDE 25 MG/1
25 SUPPOSITORY RECTAL ONCE AS NEEDED
Status: DISCONTINUED | OUTPATIENT
Start: 2017-12-01 | End: 2017-12-01 | Stop reason: HOSPADM

## 2017-12-01 RX ORDER — HYDRALAZINE HYDROCHLORIDE 20 MG/ML
10 INJECTION INTRAMUSCULAR; INTRAVENOUS EVERY 6 HOURS PRN
Status: DISCONTINUED | OUTPATIENT
Start: 2017-12-01 | End: 2017-12-02 | Stop reason: HOSPADM

## 2017-12-01 RX ORDER — ACETAMINOPHEN 325 MG/1
650 TABLET ORAL EVERY 4 HOURS PRN
Status: DISCONTINUED | OUTPATIENT
Start: 2017-12-01 | End: 2017-12-02 | Stop reason: HOSPADM

## 2017-12-01 RX ORDER — CEFAZOLIN SODIUM 2 G/100ML
2 INJECTION, SOLUTION INTRAVENOUS EVERY 8 HOURS
Status: COMPLETED | OUTPATIENT
Start: 2017-12-01 | End: 2017-12-02

## 2017-12-01 RX ORDER — PREGABALIN 75 MG/1
75 CAPSULE ORAL ONCE
Status: COMPLETED | OUTPATIENT
Start: 2017-12-01 | End: 2017-12-01

## 2017-12-01 RX ORDER — SENNA AND DOCUSATE SODIUM 50; 8.6 MG/1; MG/1
2 TABLET, FILM COATED ORAL 2 TIMES DAILY
Status: DISCONTINUED | OUTPATIENT
Start: 2017-12-01 | End: 2017-12-02 | Stop reason: HOSPADM

## 2017-12-01 RX ORDER — ONDANSETRON 2 MG/ML
4 INJECTION INTRAMUSCULAR; INTRAVENOUS EVERY 6 HOURS PRN
Status: DISCONTINUED | OUTPATIENT
Start: 2017-12-01 | End: 2017-12-02 | Stop reason: HOSPADM

## 2017-12-01 RX ORDER — MAGNESIUM HYDROXIDE 1200 MG/15ML
LIQUID ORAL AS NEEDED
Status: DISCONTINUED | OUTPATIENT
Start: 2017-12-01 | End: 2017-12-01 | Stop reason: HOSPADM

## 2017-12-01 RX ORDER — ESMOLOL HYDROCHLORIDE 10 MG/ML
INJECTION INTRAVENOUS AS NEEDED
Status: DISCONTINUED | OUTPATIENT
Start: 2017-12-01 | End: 2017-12-01 | Stop reason: SURG

## 2017-12-01 RX ORDER — DEXAMETHASONE SODIUM PHOSPHATE 4 MG/ML
INJECTION, SOLUTION INTRA-ARTICULAR; INTRALESIONAL; INTRAMUSCULAR; INTRAVENOUS; SOFT TISSUE AS NEEDED
Status: DISCONTINUED | OUTPATIENT
Start: 2017-12-01 | End: 2017-12-01 | Stop reason: SURG

## 2017-12-01 RX ORDER — PROMETHAZINE HYDROCHLORIDE 25 MG/1
25 TABLET ORAL ONCE AS NEEDED
Status: DISCONTINUED | OUTPATIENT
Start: 2017-12-01 | End: 2017-12-01 | Stop reason: HOSPADM

## 2017-12-01 RX ORDER — LEVOTHYROXINE SODIUM 112 UG/1
112 TABLET ORAL
Status: DISCONTINUED | OUTPATIENT
Start: 2017-12-02 | End: 2017-12-02 | Stop reason: HOSPADM

## 2017-12-01 RX ADMIN — TRANEXAMIC ACID 1000 MG: 100 INJECTION, SOLUTION INTRAVENOUS at 09:53

## 2017-12-01 RX ADMIN — FENTANYL CITRATE 50 MCG: 50 INJECTION INTRAMUSCULAR; INTRAVENOUS at 11:47

## 2017-12-01 RX ADMIN — OXYCODONE AND ACETAMINOPHEN 1 TABLET: 5; 325 TABLET ORAL at 08:45

## 2017-12-01 RX ADMIN — SODIUM CHLORIDE, POTASSIUM CHLORIDE, SODIUM LACTATE AND CALCIUM CHLORIDE 9 ML/HR: 600; 310; 30; 20 INJECTION, SOLUTION INTRAVENOUS at 08:08

## 2017-12-01 RX ADMIN — PROPOFOL 25 MCG/KG/MIN: 10 INJECTION, EMULSION INTRAVENOUS at 09:51

## 2017-12-01 RX ADMIN — HYDROMORPHONE HYDROCHLORIDE 0.5 MG: 1 INJECTION, SOLUTION INTRAMUSCULAR; INTRAVENOUS; SUBCUTANEOUS at 12:55

## 2017-12-01 RX ADMIN — DEXAMETHASONE SODIUM PHOSPHATE 8 MG: 4 INJECTION, SOLUTION INTRAMUSCULAR; INTRAVENOUS at 09:42

## 2017-12-01 RX ADMIN — FENTANYL CITRATE 50 MCG: 50 INJECTION INTRAMUSCULAR; INTRAVENOUS at 12:15

## 2017-12-01 RX ADMIN — MELOXICAM 15 MG: 7.5 TABLET ORAL at 15:01

## 2017-12-01 RX ADMIN — FENTANYL CITRATE 50 MCG: 50 INJECTION INTRAMUSCULAR; INTRAVENOUS at 11:55

## 2017-12-01 RX ADMIN — ONDANSETRON 4 MG: 2 INJECTION INTRAMUSCULAR; INTRAVENOUS at 11:30

## 2017-12-01 RX ADMIN — CEFAZOLIN SODIUM 2 G: 2 INJECTION, SOLUTION INTRAVENOUS at 17:15

## 2017-12-01 RX ADMIN — HYDROCODONE BITARTRATE AND ACETAMINOPHEN 1 TABLET: 7.5; 325 TABLET ORAL at 23:50

## 2017-12-01 RX ADMIN — ESMOLOL HYDROCHLORIDE 30 MG: 10 INJECTION, SOLUTION INTRAVENOUS at 09:42

## 2017-12-01 RX ADMIN — HYDROMORPHONE HYDROCHLORIDE 0.5 MG: 1 INJECTION, SOLUTION INTRAMUSCULAR; INTRAVENOUS; SUBCUTANEOUS at 13:15

## 2017-12-01 RX ADMIN — CEFAZOLIN SODIUM 2 G: 2 INJECTION, SOLUTION INTRAVENOUS at 09:37

## 2017-12-01 RX ADMIN — LIDOCAINE HYDROCHLORIDE 50 MG: 10 INJECTION, SOLUTION INFILTRATION; PERINEURAL at 09:42

## 2017-12-01 RX ADMIN — HYDROMORPHONE HYDROCHLORIDE 0.5 MG: 1 INJECTION, SOLUTION INTRAMUSCULAR; INTRAVENOUS; SUBCUTANEOUS at 11:55

## 2017-12-01 RX ADMIN — ACETAMINOPHEN 650 MG: 325 TABLET ORAL at 08:42

## 2017-12-01 RX ADMIN — HYDROMORPHONE HYDROCHLORIDE 0.5 MG: 1 INJECTION, SOLUTION INTRAMUSCULAR; INTRAVENOUS; SUBCUTANEOUS at 12:00

## 2017-12-01 RX ADMIN — LIDOCAINE HYDROCHLORIDE 0.2 ML: 10 INJECTION, SOLUTION EPIDURAL; INFILTRATION; INTRACAUDAL; PERINEURAL at 08:08

## 2017-12-01 RX ADMIN — PREGABALIN 75 MG: 75 CAPSULE ORAL at 08:42

## 2017-12-01 RX ADMIN — PROPOFOL 150 MG: 10 INJECTION, EMULSION INTRAVENOUS at 09:42

## 2017-12-01 RX ADMIN — CYCLOBENZAPRINE HYDROCHLORIDE 10 MG: 10 TABLET, FILM COATED ORAL at 13:35

## 2017-12-01 RX ADMIN — HYDROCODONE BITARTRATE AND ACETAMINOPHEN 1 TABLET: 7.5; 325 TABLET ORAL at 17:16

## 2017-12-01 RX ADMIN — FENTANYL CITRATE 50 MCG: 50 INJECTION INTRAMUSCULAR; INTRAVENOUS at 12:30

## 2017-12-01 RX ADMIN — SODIUM CHLORIDE 120 ML/HR: 9 INJECTION, SOLUTION INTRAVENOUS at 14:14

## 2017-12-01 RX ADMIN — MELOXICAM 15 MG: 7.5 TABLET ORAL at 08:42

## 2017-12-01 RX ADMIN — MUPIROCIN: 20 OINTMENT TOPICAL at 08:09

## 2017-12-01 RX ADMIN — HYDROCODONE BITARTRATE AND ACETAMINOPHEN 1 TABLET: 7.5; 325 TABLET ORAL at 13:35

## 2017-12-01 RX ADMIN — ATRACURIUM BESYLATE 40 MG: 10 INJECTION, SOLUTION INTRAVENOUS at 09:42

## 2017-12-01 RX ADMIN — FAMOTIDINE 20 MG: 20 TABLET, FILM COATED ORAL at 08:09

## 2017-12-01 RX ADMIN — TRANEXAMIC ACID 1000 MG: 100 INJECTION, SOLUTION INTRAVENOUS at 10:59

## 2017-12-01 RX ADMIN — Medication 2 TABLET: at 17:16

## 2017-12-01 NOTE — ANESTHESIA POSTPROCEDURE EVALUATION
Patient: Laxmi Mccartney    Procedure Summary     Date Anesthesia Start Anesthesia Stop Room / Location    12/01/17 0937 1149 [facility] / [facility]       Procedure Diagnosis Surgeon Provider    TIBIAL PLATEAU OPEN REDUCTION INTERNAL FIXATION  LEFT (Left Leg Lower) Closed fracture of left tibial plateau, initial encounter  (Closed fracture of left tibial plateau, initial encounter [S82.142A]) MD Andre Mae MD          Anesthesia Type: general  Last vitals  BP   118/82 (12/01/17 0753)   Temp   96.8 °F (36 °C) (12/01/17 0753)   Pulse   75 (12/01/17 0753)   Resp   16 (12/01/17 0753)     SpO2   96 % (12/01/17 0753)     Post Anesthesia Care and Evaluation    Patient location during evaluation: PACU  Patient participation: complete - patient participated  Level of consciousness: awake and alert  Pain score: 0  Pain management: adequate  Airway patency: patent  Anesthetic complications: No anesthetic complications  PONV Status: none  Cardiovascular status: hemodynamically stable and acceptable  Respiratory status: nonlabored ventilation, acceptable and nasal cannula  Hydration status: acceptable

## 2017-12-01 NOTE — ANESTHESIA PREPROCEDURE EVALUATION
Anesthesia Evaluation     NPO Solid Status: > 8 hours  NPO Liquid Status: > 8 hours     Airway   Mallampati: II  TM distance: >3 FB  Neck ROM: full  no difficulty expected  Dental      Pulmonary - normal exam   (-) asthma, not a smoker  Cardiovascular     Rhythm: regular  Rate: normal    (+) murmur,   (-) hypertension, angina, cardiac stents      Neuro/Psych  (-) seizures, TIA, CVA  GI/Hepatic/Renal/Endo    (-) liver disease, no renal disease, diabetes    Musculoskeletal     Abdominal    Substance History      OB/GYN          Other                                      Anesthesia Plan    ASA 2     general       Plan discussed with CRNA.

## 2017-12-01 NOTE — PLAN OF CARE
Problem: Perioperative Period (Adult)  Goal: Signs and Symptoms of Listed Potential Problems Will be Absent or Manageable (Perioperative Period)  Outcome: Ongoing (interventions implemented as appropriate)    12/01/17 0739   Perioperative Period   Problems Assessed (Perioperative Period) pain   Problems Present (Perioperative Period) none

## 2017-12-01 NOTE — INTERVAL H&P NOTE
"Pre-Op H&P (See Recent Office Note Attached)    Chief complaint: Left knee pain    Review of Systems:  General ROS:  no fever, chills, rashes, No change since last office visit  Cardiovascular ROS: no chest pain or dyspnea on exertion  Respiratory ROS: no cough, shortness of breath, or wheezing    Immunization History:   Influenza:  no  Pneumococcal:  no  Tetanus:  unknown    Vital Signs:  /82 (BP Location: Right arm, Patient Position: Lying)  Pulse 75  Temp 96.8 °F (36 °C) (Temporal Artery )   Resp 16  Ht 63\" (160 cm)  Wt 163 lb (73.9 kg)  LMP Comment: postmenopausal  SpO2 96%  BMI 28.87 kg/m2    Physical Exam:    CV:  S1S2 regular rate and rhythm, no murmur               Resp:  Clear to auscultation; respirations regular, even and unlabored    Results Review:    I reviewed the patient's new clinical results.    Cancer Staging (if applicable)  Cancer Patient: __ yes _x_no __unknown; If yes, clinical stage T:__ N:__M:__, stage group or __N/A      Constanza Johnson, APRN  12/1/2017   8:30 AM    Plan for ORIF left tibial plateau fracture  "

## 2017-12-01 NOTE — PROGRESS NOTES
"      [facility] Orthopaedic Surgery Progress Note    Subjective      LOS: 0 days   Patient Care Team:  Babatunde Kidd MD as PCP - General  Babatunde Kidd MD as PCP - Family Medicine    Interval History:   Patient resting comfortably in bed.  Pain is controlled.    Objective      Vital Signs  Temp (24hrs), Av.7 °F (36.5 °C), Min:96.8 °F (36 °C), Max:98.2 °F (36.8 °C)      /57 (BP Location: Right arm, Patient Position: Lying)  Pulse 63  Temp 97.4 °F (36.3 °C) (Temporal Artery )   Resp 16  Ht 63\" (160 cm)  Wt 163 lb (73.9 kg)  LMP Comment: postmenopausal  SpO2 94%  BMI 28.87 kg/m2    Examination:   Examination of the left lower extremity: The dressing is clean, dry, and intact.  Ankle dorsiflexion, ankle plantar flexion, and EHL are intact.  Sensation is intact in the foot to light touch.  Good pulse in the foot.  Able to flex and extend the knee.    Labs:    Results from last 7 days  Lab Units 17  0741   WBC 10*3/mm3 6.04   RBC 10*6/mm3 4.61   HEMOGLOBIN g/dL 11.4*   HEMATOCRIT % 36.5   PLATELETS 10*3/mm3 322       Radiology:  Imaging Results (last 24 hours)     Procedure Component Value Units Date/Time    FL > 1 Hour [[id number]] Resulted:  17 1119     Updated:  17 1119    Narrative:       This procedure was auto-finalized with no dictation required.    FL C Arm During Surgery [[id number]] Collected:  17 1322     Updated:  17 1615    Narrative:       EXAMINATION: FL C ARM DURING SURGERY- 2017      INDICATION: Left Tibial ORIF; S82.142A-Displaced bicondylar fracture of  left tibia, initial encounter for closed fracture; leg pain     COMPARISON: NONE     FINDINGS: 1 minute and 32 seconds of fluoroscopy and 4 images used for  fixation of a tibial plateau fracture. Please see the procedure report  for full details.       Impression:       Fluoroscopy for fixation of a tibial plateau fracture.     D:  2017  E:  2017         This report was finalized on " 12/1/2017 4:13 PM by Dr. Tere De Leon MD.       XR Knee 1 or 2 View Left [[id number]] Collected:  12/01/17 1648     Updated:  12/01/17 1648    Narrative:       EXAMINATION: XR KNEE 1 OR 2 VW, LEFT-12/01/2017:      INDICATION: KNEE ARTHROPLASTY.      COMPARISON: NONE.     FINDINGS: Three views of the right knee reveal hardware seen in the  proximal tibia. Postsurgical changes seen within the soft tissues.  Brace seen aligning the left knee. Joint spaces are preserved. Cortex is  otherwise intact.       Impression:       Status post fixation of a tibial plateau fracture with  postsurgical changes seen in the soft tissues. Satisfactory alignment.     D:  12/01/2017  E:  12/01/2017                   PT:        Results Review:     I reviewed the patient's new clinical results.    Assessment and Plan     Assessment:   Status post open reduction and internal fixation left tibial plateau fracture-doing well    Principal Problem:    S/P ORIF left tibial plateau  Active Problems:    Closed fracture of left tibial plateau    Hypothyroid      Plan for disposition: Possible discharge to home tomorrow if her pain is controlled.  Follow-up with me in 2 weeks (12/13/2017).      Ger Hutchins MD  12/01/17  5:43 PM

## 2017-12-01 NOTE — OP NOTE
Orthopaedics Operative Report    PREOPERATIVE DIAGNOSIS: Closed fracture of left tibial plateau, initial encounter [S82.142A]    POSTOPERATIVE DIAGNOSIS: Closed fracture of left tibial plateau, initial encounter [S82.142A]    Procedure(s):  TIBIAL PLATEAU OPEN REDUCTION INTERNAL FIXATION  LEFT, with lateral tibial plate, Synthes, locked proximally, with nonlocked screws on the shaft    Surgeon(s):  Ger Hutchins MD     IMPLANTS:   Implants     Implant    Allogrft Bone Vivigen Celluar Matrx Formable 5cc - [id number] - Implanted     Inventory item: Allogrft Bone Vivigen Celluar Matrx Formable 5cc Model/Cat number: [id number]    Serial number:  : [employer]    Lot number:  Size:     Device identifier:  Device identifier type:     As of 12/1/2017     Status: Implanted                  Plt Tib Va/Lcp Prox Smbnd Ss 4h 3.5x87 Lt - [id number] - Implanted     Inventory item: Plt Tib Va/Lcp Prox Smbnd Ss 4h 3.5x87 Lt Model/Cat number: [id number]    Serial number:  : [id number]    Lot number:  Size:     Device identifier:  Device identifier type:     As of 12/1/2017     Status: Implanted                  Scrw Conicl St Pt 3.5x70mm - [id number] - Implanted     Inventory item: Scrw Conicl St Pt 3.5x70mm Model/Cat number: [id number]    Serial number:  : DEPUY SYNTHES    Lot number:  Size:     Device identifier:  Device identifier type:     As of 12/1/2017     Status: Implanted                  Scrw Lk Va/Lcp S/Tap Strdrv 3.5x70mm - [id number] - Implanted     Inventory item: Scrw Lk Va/Lcp S/Tap Strdrv 3.5x70mm Model/Cat number: [id number]    Serial number:  : DEPUY SYNTHES    Lot number:  Size:     Device identifier:  Device identifier type:     As of 12/1/2017     Status: Implanted                  Scrw Nate S/Tap 3.5x40mm - [id number] - Implanted     Inventory item: Scrw Nate S/Tap 3.5x40mm Model/Cat number: [id number]    Serial number:  : DEPUY SYNTHES    Lot number:  Size:      Device identifier:  Device identifier type:     As of 12/1/2017     Status: Implanted                  Scrw Lk Va/Lcp S/Tap Strdrv 3.5x60mm - [id number] - Implanted     Inventory item: Scrw Lk Va/Lcp S/Tap Strdrv 3.5x60mm Model/Cat number: [id number]    Serial number:  : DEPUY SYNTHES    Lot number:  Size:     Device identifier:  Device identifier type:     As of 12/1/2017     Status: Implanted                  Scrw Nate S/Tap 3.5x34mm - [id number] - Implanted     Inventory item: Scrw Nate S/Tap 3.5x34mm Model/Cat number: [id number]    Serial number:  : DEPUY SYNTHES    Lot number:  Size:     Device identifier:  Device identifier type:     As of 12/1/2017     Status: Implanted                  Scrw Lk Va/Lcp S/Tap Strdrv 3.5x65mm - [id number] - Implanted     Inventory item: Scrw Lk Va/Lcp S/Tap Strdrv 3.5x65mm Model/Cat number: [id number]    Serial number:  : DEPUY SYNTHES    Lot number:  Size:     Device identifier:  Device identifier type:     As of 12/1/2017     Status: Implanted                         SURGEON: Ger Hutchins MD    ASSISTANT: Maya Luis PA-C    ANESTHESIA:  General    STAFF:  Circulator: Birgit Doan RN  Radiology Technologist: RT Oskar  Scrub Person: Derek Daley  Vendor Representative: Riki Dewitt  Nursing Assistant: Amado Cantor  Assistant: Maya Luis PA-C    ESTIMATED BLOOD LOSS: minimal     TOURNIQUET TIME: 82 minutes     COMPLICATIONS: None    PREOPERATIVE ANTIBIOTICS: Ancef    INDICATIONS: The patient is a 57 y.o. female with a left lateral depression type tibial plateau fracture.  Options have been discussed at length with the patient, and the patient opts for surgical intervention, knowing the risks, benefits, and alternatives to the procedure.  Risks discussed included, but are not limited to: Bleeding, infection, damage to blood vessels and nerves, need for further surgery, malunion, nonunion, deep venous thrombosis,  pulmonary embolus, death, posttraumatic arthritis, painful hardware and anesthetic complications. Consent was obtained and questions were answered.  The typical recovery time and need for nonweightbearing postoperatively has been discussed at length. We plan for open reduction and internal fixation of the tibial plateau fracture.    DESCRIPTION OF PROCEDURE: The patient was positively identified in the preoperative holding area and brought to the operating suite and placed in a supine position. After adequate general anesthetic had been achieved, the left lower extremity was prepped and draped in the usual sterile fashion, after application of a tourniquet to the left upper thigh, which was used during the procedure for a total 82 minutes. Landmarks of the knee were identified and timeout procedure was performed to confirm the operative site, as well as other parameters. Following the sterile prep and drape, a skin incision was made on the anterolateral aspect of the proximal tibia, curving posterior laterally at the level of the distal femur.  Following the sharp skin incision, dissection was carried down to level of fascia.  The fascia was incised, and brought down to the proximal tibia leaving a cuff of tissue along the anterior tibia for closure of the anterior compartment at the conclusion of the procedure.  The fracture site was readily identified along the metaphyseal diaphyseal junction, and exposure at the level of the joint was performed with a sub-meniscal arthrotomy. Tagging sutures were placed into the lateral meniscus and capsule for later repair.  The depression was readily identified, and at least 4 fragments of varying sizes comprised the majority of the fracture, and the lateral fragment in the metaphyseal area was booked open to facilitate fracture exposure and reduction.  The fragments were elevated with a combination of instruments, and good reduction was achieved, and bone graft substitute  material (Vivigen, 5 cc) was placed in the defect.  A short bend proximal tibial plate from the Synthes set was selected, and applied in standard AO fashion.  A guidepin was inserted first, to confirm trajectories of the screws proximally, followed by placement of a compression screw on the proximal anterior aspect of the plate, followed by placement of a shaft screw through the slot to apply the plate to the lateral tibia.  The remaining proximal screws were then placed, which were locked screws, for a combination of 1 unlocked screw in the anterior portion of the plate, followed by 3 locked screws in the remaining 3 proximal holes.  One kickstand screw was placed, which was also a locked screw, followed by a distal shaft screw at the tip of the plate.  Reduction was confirmed both under direct visualization and fluoroscopic imaging, and was deemed to be appropriate.  Attention was then directed towards closure, with the deep layer reapproximated with 0 Vicryl, followed by closure of the subcutaneous layer with 2-0 Vicryl, followed by closure of the skin with 3-0 Strata-fix in the subcuticular layer, followed by a Prineo wound closure dressing.  Sterile dressing was applied in the form of 4 x 4's, soft roll, and an Ace wrap.  The patient was placed into a range of motion knee brace, locked in extension, but may be hinged to 90° for range of motion. The patient tolerated the procedure well and was brought to the recovery room in good condition.     PLAN:  1.  The patient will be nonweightbearing for 6 weeks.  2.  Follow-up in 2 weeks for wound check.  3.  Overnight stay for pain control.      Ger Hutchins MD  12/01/17  5:21 PM

## 2017-12-01 NOTE — PLAN OF CARE
Problem: Orthopaedic Fracture (Adult)  Goal: Signs and Symptoms of Listed Potential Problems Will be Absent or Manageable (Orthopaedic Fracture)  Outcome: Ongoing (interventions implemented as appropriate)    Problem: Pain, Acute (Adult)  Goal: Identify Related Risk Factors and Signs and Symptoms  Outcome: Ongoing (interventions implemented as appropriate)  Goal: Acceptable Pain Control/Comfort Level  Outcome: Ongoing (interventions implemented as appropriate)    Problem: Fall Risk (Adult)  Goal: Identify Related Risk Factors and Signs and Symptoms  Outcome: Ongoing (interventions implemented as appropriate)  Goal: Absence of Falls  Outcome: Ongoing (interventions implemented as appropriate)

## 2017-12-01 NOTE — ANESTHESIA PROCEDURE NOTES
Airway  Urgency: elective    Airway not difficult    General Information and Staff    Patient location during procedure: OR  CRNA: EDELMIRA BLANK    Indications and Patient Condition  Indications for airway management: airway protection    Preoxygenated: yes  MILS not maintained throughout  Mask difficulty assessment: 1 - vent by mask    Final Airway Details  Final airway type: endotracheal airway      Successful airway: ETT  Cuffed: yes   Successful intubation technique: direct laryngoscopy  Endotracheal tube insertion site: oral  Blade: Broderick  Blade size: #3  ETT size: 6.5 mm  Cormack-Lehane Classification: grade I - full view of glottis  Placement verified by: chest auscultation and capnometry   Cuff volume (mL): 8  Measured from: lips  ETT to lips (cm): 20  Number of attempts at approach: 1    Additional Comments  Negative epigastric sounds, Breath sound equal bilaterally with symmetric chest rise and fall. Atraumatic, no damage to lips or teeth during intubation

## 2017-12-01 NOTE — H&P
Patient Name: Laxmi Mccartney  MRN: [id number]  : 1960  DOS: 2017    Attending: Ger Hutchins MD    Primary Care Provider: Babatunde Kidd MD      Chief complaint:  Left knee pain    Subjective   Patient is a 57 y.o. female presented for ORIF left tibial plateau by Dr. Hutchins under [place]. She tolerated surgery well and is admitted for further medical management.     She was rearranging furniture last Wednesday and a heavy recliner was dropped on her left leg. She was taken to the ER and a splint was applied. She had been on crutches since, non-weight bearing.    When seen postop she is doing well. She is having 5/10 pain. She denies nausea, shortness of breath or chest pain. No hx of DVT or PE.     ( Above is noted, agree.  Seen in her room afterwards, she is doing fairly well.  Her pain is under control.  No other complaints.)wy    Allergies:  No Known Allergies    Meds:  Prescriptions Prior to Admission   Medication Sig Dispense Refill Last Dose   • calcium carbonate (OS-VIKTORIYA) 600 MG tablet Take 600 mg by mouth Daily.   11/3/2017 at 0800   • Multiple Vitamins-Minerals (HAIR/SKIN/NAILS/BIOTIN PO) Take 1 capsule by mouth.   Past Month at Unknown time   • SYNTHROID 112 MCG tablet    2017 at 0800   • cyclobenzaprine (FLEXERIL) 10 MG tablet Take 1 tablet by mouth 3 (Three) Times a Day As Needed for Muscle Spasms. 12 tablet 0 2017 at 0800   • HYDROcodone-acetaminophen (NORCO) 5-325 MG per tablet Take 1-2 tablets by mouth Every 6 (Six) Hours As Needed for Severe Pain . 20 tablet 0 Unknown at Unknown time   • mupirocin (BACTROBAN) 2 % ointment Apply  topically 2 (Two) Times a Day. 22 g 0 Unknown at Unknown time   • naproxen (NAPROSYN) 500 MG tablet Take 1 tablet by mouth 2 (Two) Times a Day As Needed for Moderate Pain . 12 tablet 0 Unknown at Unknown time       History:   Past Medical History:   Diagnosis Date   • Bronchitis    • Hypothyroid     • Eustachian tube dysfunction     BRITTANY    • Exogenous  "obesity    • Fatigue      Past Surgical History:   Procedure Laterality Date   • COLONOSCOPY  2009    repeat 5 yrs a   • MANDIBLE FRACTURE SURGERY       Family History   Problem Relation Age of Onset   • Rheum arthritis Mother    • Aortic aneurysm Father    • Pernicious anemia Father    • Aortic aneurysm Sister    • Colon cancer Paternal Aunt    • Pernicious anemia Paternal Aunt      Social History   Substance Use Topics   • Smoking status: Never Smoker   • Smokeless tobacco: Never Used   • Alcohol use Yes      Comment: socially   She is single, no children. She is a retired teacher.    Review of Systems  All systems were reviewed and negative except for:  Gastrointestinal: postitive for  constipation    Vital Signs  /75  Pulse 78  Temp 98.2 °F (36.8 °C) (Temporal Artery )   Resp 16  Ht 63\" (160 cm)  Wt 163 lb (73.9 kg)  LMP Comment: postmenopausal  SpO2 95%  BMI 28.87 kg/m2    Physical Exam:    General Appearance:    Alert, cooperative, in no acute distress   Head:    Normocephalic, without obvious abnormality, atraumatic   Eyes:            Lids and lashes normal, conjunctivae and sclerae normal, no   icterus, no pallor, corneas clear   Ears:    Ears appear intact with no abnormalities noted   Neck:   No adenopathy, supple, trachea midline, no thyromegaly   Lungs:     Clear to auscultation,respirations regular, even and                   unlabored    Heart:    Regular rhythm and normal rate, normal S1 and S2, no            murmur, no gallop, no rub, no click   Abdomen:     Normal bowel sounds, no masses, no organomegaly, soft        non-tender, non-distended, no guarding, no rebound                 tenderness   Genitalia:    Deferred   Extremities:   LLE with ACE CDI. Hinge brace in place.    Pulses:   Pulses palpable and equal bilaterally   Skin:   No bleeding, bruising or rash   Neurologic:   Cranial nerves 2 - 12 grossly intact, sensation intact      I reviewed the patient's new clinical results. "         Results from last 7 days  Lab Units 12/01/17  0741   WBC 10*3/mm3 6.04   HEMOGLOBIN g/dL 11.4*   HEMATOCRIT % 36.5   PLATELETS 10*3/mm3 322       Results from last 7 days  Lab Units 12/01/17  0741   SODIUM mmol/L 140   POTASSIUM mmol/L 3.9   CHLORIDE mmol/L 107   CO2 mmol/L 27.0   BUN mg/dL 13   CREATININE mg/dL 0.90   CALCIUM mg/dL 9.1   GLUCOSE mg/dL 110*       Assessment and Plan:   Principal Problem:    S/P ORIF left tibial plateau  Active Problems:    Closed fracture of left tibial plateau    Hypothyroid      Plan  1. PT/OT- NWB LLE  2. Pain control-prns   3. IS-encourage  4. DVT proph- Mechs/ASA  5. Bowel regimen  6. Resume home medications as appropriate  7. Monitor post-op labs  8. DC planning for home, likely tomorrow    Hypothyroid  - Continue home Synthroid    Seen and examined by me. Agree with above. Discussed with patient and niece.ave Fofana MD  12/01/17  5:17 PM

## 2017-12-02 VITALS
SYSTOLIC BLOOD PRESSURE: 140 MMHG | TEMPERATURE: 98.4 F | WEIGHT: 163 LBS | RESPIRATION RATE: 16 BRPM | DIASTOLIC BLOOD PRESSURE: 64 MMHG | BODY MASS INDEX: 28.88 KG/M2 | HEART RATE: 80 BPM | OXYGEN SATURATION: 99 % | HEIGHT: 63 IN

## 2017-12-02 LAB
ANION GAP SERPL CALCULATED.3IONS-SCNC: 9 MMOL/L (ref 3–11)
BUN BLD-MCNC: 10 MG/DL (ref 9–23)
BUN/CREAT SERPL: 12.5 (ref 7–25)
CALCIUM SPEC-SCNC: 8.4 MG/DL (ref 8.7–10.4)
CHLORIDE SERPL-SCNC: 108 MMOL/L (ref 99–109)
CO2 SERPL-SCNC: 23 MMOL/L (ref 20–31)
CREAT BLD-MCNC: 0.8 MG/DL (ref 0.6–1.3)
DEPRECATED RDW RBC AUTO: 43.9 FL (ref 37–54)
ERYTHROCYTE [DISTWIDTH] IN BLOOD BY AUTOMATED COUNT: 15 % (ref 11.3–14.5)
GFR SERPL CREATININE-BSD FRML MDRD: 73 ML/MIN/1.73
GLUCOSE BLD-MCNC: 119 MG/DL (ref 70–100)
HCT VFR BLD AUTO: 31.8 % (ref 34.5–44)
HGB BLD-MCNC: 9.8 G/DL (ref 11.5–15.5)
MCH RBC QN AUTO: 24.8 PG (ref 27–31)
MCHC RBC AUTO-ENTMCNC: 30.8 G/DL (ref 32–36)
MCV RBC AUTO: 80.5 FL (ref 80–99)
PLATELET # BLD AUTO: 276 10*3/MM3 (ref 150–450)
PMV BLD AUTO: 9 FL (ref 6–12)
POTASSIUM BLD-SCNC: 4.2 MMOL/L (ref 3.5–5.5)
RBC # BLD AUTO: 3.95 10*6/MM3 (ref 3.89–5.14)
SODIUM BLD-SCNC: 140 MMOL/L (ref 132–146)
WBC NRBC COR # BLD: 8.19 10*3/MM3 (ref 3.5–10.8)

## 2017-12-02 PROCEDURE — G0378 HOSPITAL OBSERVATION PER HR: HCPCS

## 2017-12-02 PROCEDURE — 85027 COMPLETE CBC AUTOMATED: CPT | Performed by: ORTHOPAEDIC SURGERY

## 2017-12-02 PROCEDURE — 97530 THERAPEUTIC ACTIVITIES: CPT

## 2017-12-02 PROCEDURE — 25010000003 CEFAZOLIN IN DEXTROSE 2-4 GM/100ML-% SOLUTION: Performed by: ORTHOPAEDIC SURGERY

## 2017-12-02 PROCEDURE — 97161 PT EVAL LOW COMPLEX 20 MIN: CPT

## 2017-12-02 PROCEDURE — 97116 GAIT TRAINING THERAPY: CPT

## 2017-12-02 PROCEDURE — 97165 OT EVAL LOW COMPLEX 30 MIN: CPT

## 2017-12-02 PROCEDURE — 80048 BASIC METABOLIC PNL TOTAL CA: CPT | Performed by: NURSE PRACTITIONER

## 2017-12-02 RX ORDER — TRAMADOL HYDROCHLORIDE 50 MG/1
50 TABLET ORAL EVERY 6 HOURS PRN
Start: 2017-12-02 | End: 2017-12-02

## 2017-12-02 RX ORDER — TRAMADOL HYDROCHLORIDE 50 MG/1
50 TABLET ORAL EVERY 6 HOURS PRN
Qty: 20 TABLET | Refills: 0 | Status: SHIPPED | OUTPATIENT
Start: 2017-12-02 | End: 2018-04-11

## 2017-12-02 RX ADMIN — Medication 2 TABLET: at 08:07

## 2017-12-02 RX ADMIN — ASPIRIN 325 MG: 325 TABLET, DELAYED RELEASE ORAL at 08:07

## 2017-12-02 RX ADMIN — LEVOTHYROXINE SODIUM 112 MCG: 112 TABLET ORAL at 05:05

## 2017-12-02 RX ADMIN — CEFAZOLIN SODIUM 2 G: 2 INJECTION, SOLUTION INTRAVENOUS at 02:38

## 2017-12-02 RX ADMIN — HYDROCODONE BITARTRATE AND ACETAMINOPHEN 1 TABLET: 7.5; 325 TABLET ORAL at 08:07

## 2017-12-02 RX ADMIN — HYDROCODONE BITARTRATE AND ACETAMINOPHEN 1 TABLET: 7.5; 325 TABLET ORAL at 11:50

## 2017-12-02 RX ADMIN — MELOXICAM 15 MG: 7.5 TABLET ORAL at 08:08

## 2017-12-02 NOTE — PLAN OF CARE
Problem: Orthopaedic Fracture (Adult)  Intervention: Promote Pulmonary Hygiene and Secretion Clearance  PT has been vigorously using incentive spirometer.

## 2017-12-02 NOTE — THERAPY DISCHARGE NOTE
Acute Care - Physical Therapy Initial Eval/Discharge  [facility]     Patient Name: Laxmi Mccartney  : 1960  MRN: [id number]  Today's Date: 2017   Onset of Illness/Injury or Date of Surgery Date: 17  Date of Referral to PT: 17  Referring Physician: MD Cuong      Admit Date: 2017    Visit Dx:    ICD-10-CM ICD-9-CM   1. Impaired mobility and ADLs Z74.09 799.89   2. Closed fracture of left tibial plateau, initial encounter S82.142A 823.00   3. Impaired functional mobility, balance, gait, and endurance Z74.09 V49.89     Patient Active Problem List   Diagnosis   • Closed fracture of left tibial plateau   • Tibial plateau fracture, left, closed, initial encounter   • S/P ORIF left tibial plateau   • Hypothyroid     Past Medical History:   Diagnosis Date   • Abdominal pain    • Acute serous otitis media    • Acute sinusitis    • Acute suppurative otitis media    • Bronchitis    • Congestion and hemorrhage of prostate    • Cough    • Disease of thyroid gland    • Eustachian tube dysfunction     BRITTANY    • Exogenous obesity    • Fatigue    • Gastroenteritis    • Influenza    • Sneezing    • Vaginitis      Past Surgical History:   Procedure Laterality Date   • COLONOSCOPY  2009    repeat 5 yrs a   • MANDIBLE FRACTURE SURGERY            PT ASSESSMENT (last 72 hours)      PT Evaluation       17 0940 17 0938    Rehab Evaluation    Document Type evaluation;discharge summary  -LR evaluation;discharge summary  -TB    Subjective Information agree to therapy;no complaints  -LR no complaints;agree to therapy  -TB    Patient Effort, Rehab Treatment good  -LR good  -TB    Symptoms Noted During/After Treatment none  -LR none  -TB    General Information    Patient Profile Review yes  -LR yes  -TB    Onset of Illness/Injury or Date of Surgery Date 17  -LR 17  -TB    Referring Physician MD Cuong  -LR Cuong  -TB    General Observations Patient supine in bed upon arrival. 4x4 applied to L  LE incision, awaiting thigh high DANYEL to be placed over 4x4.   -LR Pt rec'vd in high fowlers, room air; no family present  -TB    Pertinent History Of Current Problem Patient was moving a couch at home on 11/22/17. She lost her balance and dropped couch on her L LE with immediate pain and inability to weight bear on L LE. Presents now for surgical management.   -LR Pt s/p injury moving furniture; Imaging (+) Closed Fx of L Tibial Plateau; s/p ORIF L Tibial Plateau  -TB    Precautions/Limitations fall precautions;non-weight bearing status;brace on when up;other (see comments)   NWB L LE, hinged knee brace to L LE at all times.   -LR fall precautions;non-weight bearing status;brace on when up   NWB L LE; long brace L LE  -TB    Prior Level of Function independent:;all household mobility;community mobility;gait;transfer;bed mobility;ADL's;home management;cooking;driving;cleaning;using stairs;shopping  -LR independent:;all household mobility;community mobility;transfer;bed mobility;ADL's;home management;driving;shopping;using stairs  -TB    Equipment Currently Used at Home crutches, axillary;raised toilet;shower chair  -LR crutches  -TB    Plans/Goals Discussed With patient;agreed upon  -LR patient;agreed upon  -TB    Risks Reviewed patient:;LOB;nausea/vomiting;dizziness;increased discomfort  -LR patient:;LOB;increased discomfort;lines disloged  -TB    Benefits Reviewed patient:;improve function;increase independence;increase strength;increase balance;decrease pain;increase knowledge  -LR patient:;improve function;increase independence;increase knowledge  -TB    Barriers to Rehab none identified  -LR none identified  -TB    Living Environment    Lives With alone  -LR alone  -TB    Living Arrangements house  -LR house  -TB    Home Accessibility other (see comments)   walk in shower, no steps to enter  -LR     Number of Stairs to Enter Home 0  -LR     Number of Stairs Within Home 0  -LR     Type of Financial/Environmental  Concern none  -LR     Transportation Available family or friend will provide  -LR     Living Environment Comment Patient reports she will be discharging to her sister's home which will not have any steps to enter. Would have to steps to access patient's home.   -LR Pt plans to d/c home with friend and then to family members home to avoid stairs; will have walk-in shower with seat; recommend BSC - education completed  -TB    Clinical Impression    Date of Referral to PT 12/01/17  -LR     PT Diagnosis closed fx of L tibial plateau/ s/p ORIF L tibial plateau fx with lateral tibial plate and nonlocked screws on shaft.   -LR     Prognosis good  -LR     Patient/Family Goals Statement go home  -LR     Criteria for Skilled Therapeutic Interventions Met yes;treatment indicated  -LR     Rehab Potential good, to achieve stated therapy goals  -LR     Vital Signs    O2 Delivery Post Treatment  room air  -TB    Pre Patient Position  Sitting  -TB    Intra Patient Position  Standing  -TB    Post Patient Position  Sitting  -TB    Pain Assessment    Pain Assessment 0-10  -LR Living[place]-Abad FACES  -TB    Livingston-Abad FACES Pain Rating  2  -TB    Pain Score 0  -LR     Post Pain Score 3  -LR     Pain Type Acute pain  -LR Acute pain  -TB    Pain Location Knee  -LR Knee  -TB    Pain Orientation Left  -LR Left  -TB    Pain Descriptors Aching;Dull  -LR     Pain Intervention(s) Repositioned;Ambulation/increased activity  -LR Ambulation/increased activity;Repositioned  -TB    Response to Interventions  Good pain control  -TB    Vision Assessment/Intervention    Visual Impairment WFL  -LR WFL with corrective lenses  -TB    Cognitive Assessment/Intervention    Current Cognitive/Communication Assessment functional  -LR functional  -TB    Orientation Status oriented x 4;required verbal cueing (specifiy in comments)  -LR oriented x 4  -TB    Follows Commands/Answers Questions 100% of the time;able to follow single-step instructions;needs cueing;needs  repetition  -% of the time  -TB    Personal Safety WNL/WFL  -LR WNL/WFL  -TB    Personal Safety Interventions  fall prevention program maintained;gait belt;nonskid shoes/slippers when out of bed   NWB L LE, Brace L LE  -TB    Short/Long Term Memory  intact short term memory;intact long term memory  -TB    ROM (Range of Motion)    General ROM no range of motion deficits identified   deferred at L knee   -LR no range of motion deficits identified  -TB    MMT (Manual Muscle Testing)    General MMT Assessment no strength deficits identified   deferred at L knee  -LR no strength deficits identified  -TB    Mobility Assessment/Training    Extremity Weight-Bearing Status left lower extremity  -LR     Left Lower Extremity Weight-Bearing non weight-bearing  -LR     Bed Mobility, Assessment/Treatment    Bed Mobility, Assistive Device head of bed elevated  -LR     Bed Mobility, Scoot/Bridge, [place]  independent  -TB    Bed Mob, Supine to Sit, [place] conditional independence  -LR independent  -TB    Bed Mob, Sit to Supine, [place] conditional independence  -LR independent  -TB    Bed Mobility, Safety Issues decreased use of legs for bridging/pushing  -LR decreased use of legs for bridging/pushing  -TB    Bed Mobility, Impairments ROM decreased;pain  -LR     Bed Mobility, Comment Donned knee brace while supine in bed. Patient denied dizziness upon sitting up and did not require any assist to move L LE.   -LR     Transfer Assessment/Treatment    Transfers, Sit-Stand [place] verbal cues required;contact guard assist  -LR contact guard assist;verbal cues required  -TB    Transfers, Stand-Sit [place] verbal cues required;contact guard assist  -LR contact guard assist;verbal cues required  -TB    Transfers, Sit-Stand-Sit, Assist Device rolling walker  -LR rolling walker  -TB    Toilet Transfer, [place]  supervision required  -TB    Toilet Transfer, Assistive Device  bedside commode without drop  arms  -TB    Transfer, Maintain Weight Bearing Status able to maintain weight bearing status;cues to maintain weight bearing status  -LR     Transfer, Safety Issues sequencing ability decreased;step length decreased;weight-shifting ability decreased  -LR     Transfer, Impairments ROM decreased;strength decreased;pain  -LR --   NWB L LE  -TB    Transfer, Comment Verbal cues to push up from bed to stand instead of pulling up on RW. Verbal cues to reach back for bed to lower into sitting. Cues for NWB on L LE with t/f.   -LR pt transfering to [facility] ad pelon  -TB    Gait Assessment/Treatment    Gait, [place] Level verbal cues required;contact guard assist  -LR     Gait, Assistive Device rolling walker;axillary crutches   First 50 feet with RW, second 50 feet with crutches  -LR     Gait, Distance (Feet) 100  -LR     Gait, Gait Pattern Analysis swing-to gait  -LR     Gait, Gait Deviations step length decreased  -LR     Gait, Maintain Weight Bearing Status able to maintain weight bearing status;cues to maintain weight bearing status  -LR     Gait, Safety Issues step length decreased  -LR     Gait, Impairments ROM decreased;strength decreased;pain  -LR     Gait, Comment Patient ambulated first 50 feet with RW. Verbal cues for NWB L LE and correct use of RW for gait. Ambulated last 50 feet with axillary crutches. Cues for correct sequencing. Patienit demonstrated no LOB or unsteadiness with gait with either AD. Cues for decreased pace with crutches. Patient seems safer with RW. Gait limited by fatigue.   -LR     Stairs Assessment/Treatment    Stairs, Comment Patient reports she has been going up stairs on her bottom and then pulling herself up into standing. Reports she plans to continue to use this technique for next 6 weeks. Educated and demonstrated correct stair training technique with axillary crutches.   -LR     Balance Skills Training    Sitting-Level of Assistance  Independent  -TB    Standing-Level of Assistance   Close supervision  -TB    Therapy Exercises    Bilateral Upper Extremity  AROM:;sitting;shoulder extension/flexion;shoulder abduction/adduction;shoulder horizontal abd/add;shoulder ER/IR;elbow flexion/extension;pronation/supination;hand pumps  -TB    Fine Motor Coordination Training    Opposition  Right:;Left:;intact  -TB    Sensory Assessment/Intervention    Sensory Impairment --   denies numbness/tingling;able to actively DF bilaterally  -LR     Light Touch  LUE;RUE  -TB    LUE Light Touch  WNL  -TB    RUE Light Touch  WNL  -TB    Positioning and Restraints    Pre-Treatment Position in bed  -LR in bed  -TB    Post Treatment Position bed  -LR bed  -TB    In Bed notified nsg;supine;call light within reach;encouraged to call for assist;side rails up x2   brace to L knee  -LR fowlers;call light within reach  -TB      12/02/17 0926 12/01/17 0756    General Information    Equipment Currently Used at Home other (see comments);commode  -SP crutches  -TH    Living Environment    Lives With alone  -SP other relative(s) (specify)  -TH    Living Arrangements house  -SP house  -TH    Home Accessibility no concerns  -SP stairs within home;stairs to enter home  -TH    Number of Stairs to Enter Home  2  -TH    Number of Stairs Within Home  12  -TH    Stair Railings at Home none  -SP inside, present on left side  -TH    Type of Financial/Environmental Concern none  -SP none  -TH    Transportation Available car  -SP car;family or friend will provide  -TH      User Key  (r) = Recorded By, (t) = Taken By, (c) = Cosigned By    Initials Name Provider Type    TB Terra Munoz, OT Occupational Therapist    LR Ciara Cortez, PT Physical Therapist    SP Taylor Porter, RN Case Manager    TH Li Webb, RN Registered Nurse          Physical Therapy Education     Title: PT OT SLP Therapies (Done)     Topic: Physical Therapy (Done)     Point: Mobility training (Done)    Learning Progress Summary    Learner Readiness  Method Response Comment Documented by Status   Patient Acceptance DTOM DU Educated on correct gait technique with RW and axillary crutches. Educated on stair training with crutches. LR 12/02/17 1155 Done               Point: Home exercise program (Done)    Learning Progress Summary    Learner Readiness Method Response Comment Documented by Status   Patient Acceptance VENITAE BUDDY PEARL Educated on correct gait technique with RW and axillary crutches. Educated on stair training with crutches. LR 12/02/17 1155 Done               Point: Body mechanics (Done)    Learning Progress Summary    Learner Readiness Method Response Comment Documented by Status   Patient Acceptance TOM NATHAN DU Educated on correct gait technique with RW and axillary crutches. Educated on stair training with crutches. LR 12/02/17 1155 Done               Point: Precautions (Done)    Learning Progress Summary    Learner Readiness Method Response Comment Documented by Status   Patient Acceptance TOM NATHAN DU Educated on correct gait technique with RW and axillary crutches. Educated on stair training with crutches. LR 12/02/17 1155 Done                      User Key     Initials Effective Dates Name Provider Type Discipline    LR 06/19/15 -  Ciara Cortez, PT Physical Therapist PT                PT Recommendation and Plan  Anticipated Equipment Needs At Discharge: bedside commode, front wheeled walker  Anticipated Discharge Disposition: home with assist  Planned Therapy Interventions: other (see comments) (eval only; d/c home today)  PT Frequency: evaluation only, other (see comments) (d/c home today)  Plan of Care Review  Plan Of Care Reviewed With: patient  Progress: progress toward functional goals as expected  Outcome Summary/Follow up Plan: Patient ambulated 50 feet with RW and 50 feet with axillary crutches. Had no difficulty with using either AD, seemed safer with RW, but no LOB or unsteadiness with either AD. Patient had no difficulty maintaining  NWB on L LE with mobility. Patient has been d/c home today.           IP PT Goals       12/02/17 0940          Bed Mobility PT LTG    Bed Mobility PT LTG, Date Established 12/02/17  -LR      Bed Mobility PT LTG, Time to Achieve 1 day  -LR      Bed Mobility PT LTG, Activity Type supine to sit/sit to supine  -LR      Bed Mobility PT LTG, [place] Level conditional independence  -LR      Bed Mobility PT LTG, Date Goal Reviewed 12/02/17  -LR      Bed Mobility PT LTG, Outcome goal met  -LR      Transfer Training PT LTG    Transfer Training PT LTG, Date Established 12/02/17  -LR      Transfer Training PT LTG, Time to Achieve 1 day  -LR      Transfer Training PT LTG, Activity Type sit to stand/stand to sit  -LR      Transfer Training PT LTG, [place] Level contact guard assist  -LR      Transfer Training PT LTG, Assist Device walker, rolling;crutches, axillary  -LR      Transfer Training PT  LTG, Date Goal Reviewed 12/02/17  -LR      Transfer Training PT LTG, Outcome goal met  -LR      Gait Training PT LTG    Gait Training Goal PT LTG, Date Established 12/02/17  -LR      Gait Training Goal PT LTG, Time to Achieve 1 day  -LR      Gait Training Goal PT LTG, [place] Level contact guard assist  -LR      Gait Training Goal PT LTG, Assist Device walker, rolling;crutches, axillary  -LR      Gait Training Goal PT LTG, Distance to Achieve 100 feet  -LR      Gait Training Goal PT LTG, Date Goal Reviewed 12/02/17  -LR      Gait Training Goal PT LTG, Outcome goal met  -LR        User Key  (r) = Recorded By, (t) = Taken By, (c) = Cosigned By    Initials Name Provider Type    LR Ciara Cortez, PT Physical Therapist                Outcome Measures       12/02/17 0940 12/02/17 0938       How much help from another person do you currently need...    Turning from your back to your side while in flat bed without using bedrails? 4  -LR      Moving from lying on back to sitting on the side of a flat bed without bedrails? 4   -LR      Moving to and from a bed to a chair (including a wheelchair)? 3  -LR      Standing up from a chair using your arms (e.g., wheelchair, bedside chair)? 3  -LR      Climbing 3-5 steps with a railing? 3  -LR      To walk in hospital room? 3  -LR      AM-PAC 6 Clicks Score 20  -LR      How much help from another is currently needed...    Putting on and taking off regular lower body clothing?  3  -TB     Bathing (including washing, rinsing, and drying)  3  -TB     Toileting (which includes using toilet bed pan or urinal)  3  -TB     Putting on and taking off regular upper body clothing  4  -TB     Taking care of personal grooming (such as brushing teeth)  4  -TB     Eating meals  4  -TB     Score  21  -TB     Functional Assessment    Outcome Measure Options AM-PAC 6 Clicks Basic Mobility (PT)  -LR AM-PAC 6 Clicks Daily Activity (OT)  -TB       User Key  (r) = Recorded By, (t) = Taken By, (c) = Cosigned By    Initials Name Provider Type    TB Terra Munoz, OT Occupational Therapist    LR Ciara Cortez, PT Physical Therapist           Time Calculation:         PT Charges       12/02/17 1042          Time Calculation    Start Time 0940  -LR      PT Received On 12/02/17  -LR      PT Goal Re-Cert Due Date 12/12/17  -LR      Time Calculation- PT    Total Timed Code Minutes- PT 15 minute(s)  -LR        User Key  (r) = Recorded By, (t) = Taken By, (c) = Cosigned By    Initials Name Provider Type    LR Ciara Cortez, PT Physical Therapist          Therapy Charges for Today     Code Description Service Date Service Provider Modifiers Qty    [id number]  GAIT TRAINING EA 15 MIN 12/2/2017 Ciara Cortez, PT GP 1    [id number] HC PT EVAL LOW COMPLEXITY 3 12/2/2017 Ciara Cortez, PT GP 1          PT G-Codes  Outcome Measure Options: AM-PAC 6 Clicks Basic Mobility (PT)    PT Discharge Summary  Anticipated Discharge Disposition: home with assist  Reason for Discharge: Discharge from  facility  Outcomes Achieved: Discharge from facility occurred on same date as evluation  Discharge Destination: Home with assist    Ciara Cortez, PT  12/2/2017

## 2017-12-02 NOTE — DISCHARGE SUMMARY
Patient Name: Laxmi Mccartney  MRN: [id number]  : 1960  DOS: 2017    Attending: Ger Hutchins MD    Primary Care Provider: Babatunde Kidd MD    Date of Admission:.2017  7:12 AM    Date of Discharge:  2017    Discharge Diagnosis: Principal Problem:    S/P ORIF left tibial plateau  Active Problems:    Closed fracture of left tibial plateau    Hypothyroid      Hospital Course  Patient is a 57 y.o. female presented for scheduled surgery by .  Per his note: ( The patient is a 57 y.o. female with a left lateral depression type tibial plateau fracture.  Options have been discussed at length with the patient, and the patient opts for surgical intervention, knowing the risks, benefits, and alternatives to the procedure.  Risks discussed included, but are not limited to: Bleeding, infection, damage to blood vessels and nerves, need for further surgery, malunion, nonunion, deep venous thrombosis, pulmonary embolus, death, posttraumatic arthritis, painful hardware and anesthetic complications. Consent was obtained and questions were answered.  The typical recovery time and need for nonweightbearing postoperatively has been discussed at length. We plan for open reduction and internal fixation of the tibial plateau fracture.)    She tolerated surgery well and was admitted overnight for further management.  Patient was provided pain medications as needed for pain control.    She was seen by PT and OT and has progressed well over her stay.  She used an IS for atelectasis prophylaxis and ASA  along with mechanicals for DVT prophylaxis.    Home medications were resumed as appropriate, and labs were monitored and remained fairly stable.   With the progress she has made, pt is ready for DC home today.       Discussed with patient regarding plan and she shows understanding and agreement.    Patient will remain NWB LLE for several weeks and until cleared by .        Procedures  "Performed  Procedure(s):  TIBIAL PLATEAU OPEN REDUCTION INTERNAL FIXATION  LEFT       Pertinent Test Results:    I reviewed the patient's new clinical results.     Results from last 7 days  Lab Units 17  0701 17  0741   WBC 10*3/mm3 8.19 6.04   HEMOGLOBIN g/dL 9.8* 11.4*   HEMATOCRIT % 31.8* 36.5   PLATELETS 10*3/mm3 276 322       Results from last 7 days  Lab Units 17  0701 17  0741   SODIUM mmol/L 140 140   POTASSIUM mmol/L 4.2 3.9   CHLORIDE mmol/L 108 107   CO2 mmol/L 23.0 27.0   BUN mg/dL 10 13   CREATININE mg/dL 0.80 0.90   CALCIUM mg/dL 8.4* 9.1   GLUCOSE mg/dL 119* 110*     I reviewed the patient's new imaging including images and reports.          Physical therapy    Discharge Assessment:    Vital Signs  /64 (BP Location: Right arm, Patient Position: Lying)  Pulse 80  Temp 98.4 °F (36.9 °C) (Oral)   Resp 16  Ht 63\" (160 cm)  Wt 163 lb (73.9 kg)  LMP Comment: postmenopausal  SpO2 99%  BMI 28.87 kg/m2  Temp (24hrs), Av.1 °F (36.7 °C), Min:97.4 °F (36.3 °C), Max:98.7 °F (37.1 °C)      General Appearance:    Alert, cooperative, in no acute distress   Lungs:     Clear to auscultation,respirations regular, even and                   unlabored    Heart:    Regular rhythm and normal rate, normal S1 and S2, no            Murmur    Abdomen:     Normal bowel sounds, no masses, no organomegaly, soft        non-tender, non-distended, no guarding, no rebound                 tenderness   Extremities:   Left knee with CDI dressing. Knee immobilizer on.   Pulses:   Pulses palpable and equal bilaterally   Skin:   No bleeding, bruising or rash   Neurologic:   Cranial nerves 2 - 12 grossly intact, sensation intact, DTR        present and equal bilaterally.    Left ankle flexion and dorsiflexion intact. Sensory intact.       Discharge Disposition: Home.     Discharge Medications   Laxmi Mccartney   Home Medication Instructions COSTA:[id number]    Printed on:17 1201   Medication " Information                      aspirin  MG EC tablet  Take 1 tablet by mouth Daily.             calcium carbonate (OS-VIKTORIYA) 600 MG tablet  Take 600 mg by mouth Daily.             cyclobenzaprine (FLEXERIL) 10 MG tablet  Take 1 tablet by mouth 3 (Three) Times a Day As Needed for Muscle Spasms.             HYDROcodone-acetaminophen (NORCO) 5-325 MG per tablet  Take 1-2 tablets by mouth Every 6 (Six) Hours As Needed for Severe Pain .             Multiple Vitamins-Minerals (HAIR/SKIN/NAILS/BIOTIN PO)  Take 1 capsule by mouth.             mupirocin (BACTROBAN) 2 % ointment  Apply  topically 2 (Two) Times a Day.             naproxen (NAPROSYN) 500 MG tablet  Take 1 tablet by mouth 2 (Two) Times a Day As Needed for Moderate Pain .             SYNTHROID 112 MCG tablet               traMADol (ULTRAM) 50 MG tablet  Take 1 tablet by mouth Every 6 (Six) Hours As Needed for Moderate Pain .                 Discharge Diet: Regular.    Activity at Discharge: NWB LLE.Hinged Knee brace.    Follow-up Appointments   2 weeks.      Kimberli Fofana MD  12/02/17  12:01 PM

## 2017-12-02 NOTE — PLAN OF CARE
Problem: Patient Care Overview (Adult)  Goal: Plan of Care Review  Outcome: Outcome(s) achieved Date Met:  12/02/17 12/02/17 1038   Coping/Psychosocial Response Interventions   Plan Of Care Reviewed With patient   Outcome Evaluation   Outcome Summary/Follow up Plan OT IE completed. Pt CGA for mobility; SBA for BSC transfer. Education completed for surgical/NWB precautions, home safety/fall prevention. Pt set to d/c today. OT to d/c at this time.         Problem: Inpatient Occupational Therapy  Goal: Patient Education Goal STG- OT  Outcome: Outcome(s) achieved Date Met:  12/02/17 12/02/17 1038   Patient Education OT STG   Patient Education OT STG, Time to Achieve 1 day   Patient Education OT STG, Education Type HEP;precautions per surgeon;positioning;home safety   Patient Education OT STG, Education Understanding demonstrates adequately;verbalizes understanding   Patient Education OT STG Outcome goal met

## 2017-12-02 NOTE — PLAN OF CARE
Problem: Patient Care Overview (Adult)  Goal: Plan of Care Review  Outcome: Outcome(s) achieved Date Met:  12/02/17 12/02/17 0940   Coping/Psychosocial Response Interventions   Plan Of Care Reviewed With patient   Outcome Evaluation   Outcome Summary/Follow up Plan Patient ambulated 50 feet with RW and 50 feet with axillary crutches. Had no difficulty with using either AD, seemed safer with RW, but no LOB or unsteadiness with either AD. Patient had no difficulty maintaining NWB on L LE with mobility. Patient has been d/c home today.    Patient Care Overview   Progress progress toward functional goals as expected         Problem: Inpatient Physical Therapy  Goal: Bed Mobility Goal LTG- PT  Outcome: Outcome(s) achieved Date Met:  12/02/17 12/02/17 0940   Bed Mobility PT LTG   Bed Mobility PT LTG, Date Established 12/02/17   Bed Mobility PT LTG, Time to Achieve 1 day   Bed Mobility PT LTG, Activity Type supine to sit/sit to supine   Bed Mobility PT LTG, [place] Level conditional independence   Bed Mobility PT LTG, Date Goal Reviewed 12/02/17   Bed Mobility PT LTG, Outcome goal met       Goal: Transfer Training Goal 1 LTG- PT  Outcome: Outcome(s) achieved Date Met:  12/02/17 12/02/17 0940   Transfer Training PT LTG   Transfer Training PT LTG, Date Established 12/02/17   Transfer Training PT LTG, Time to Achieve 1 day   Transfer Training PT LTG, Activity Type sit to stand/stand to sit   Transfer Training PT LTG, [place] Level contact guard assist   Transfer Training PT LTG, Assist Device walker, rolling;crutches, axillary   Transfer Training PT LTG, Date Goal Reviewed 12/02/17   Transfer Training PT LTG, Outcome goal met       Goal: Gait Training Goal LTG- PT  Outcome: Outcome(s) achieved Date Met:  12/02/17 12/02/17 0940   Gait Training PT LTG   Gait Training Goal PT LTG, Date Established 12/02/17   Gait Training Goal PT LTG, Time to Achieve 1 day   Gait Training Goal PT LTG, [place] Level  contact guard assist   Gait Training Goal PT LTG, Assist Device walker, rolling;crutches, axillary   Gait Training Goal PT LTG, Distance to Achieve 100 feet   Gait Training Goal PT LTG, Date Goal Reviewed 12/02/17   Gait Training Goal PT LTG, Outcome goal met

## 2017-12-02 NOTE — PROGRESS NOTES
"      [facility] [place] [facility] Progress Note    Subjective      LOS: 0 days   Patient Care Team:  Babatunde Kidd MD as PCP - General  Babatunde Kidd MD as PCP - Family Medicine    Interval History:   Patient resting comfortably in bed.  Pain is controlled.She would like to go home today.    Objective      Vital Signs  Temp (24hrs), Av °F (36.7 °C), Min:97.4 °F (36.3 °C), Max:98.7 °F (37.1 °C)      /63 (BP Location: Right arm, Patient Position: Lying)  Pulse 84  Temp 98.7 °F (37.1 °C) (Oral)   Resp 16  Ht 63\" (160 cm)  Wt 163 lb (73.9 kg)  LMP Comment: postmenopausal  SpO2 98%  BMI 28.87 kg/m2    Examination:   Examination of the left lower extremity: The wound is clean, dry, and intact.  Ankle dorsiflexion, ankle plantar flexion, and EHL are intact.  Sensation is intact in the foot to light touch.  Good pulse in the foot.    Labs:    Results from last 7 days  Lab Units 17  0701   WBC 10*3/mm3 8.19   RBC 10*6/mm3 3.95   HEMOGLOBIN g/dL 9.8*   HEMATOCRIT % 31.8*   PLATELETS 10*3/mm3 276       Radiology:  Imaging Results (last 24 hours)     Procedure Component Value Units Date/Time    FL > 1 Hour [[id number]] Resulted:  17 1119     Updated:  17 1119    Narrative:       This procedure was auto-finalized with no dictation required.    FL C Arm During Surgery [[id number]] Collected:  17 1322     Updated:  17 1615    Narrative:       EXAMINATION: FL C ARM DURING SURGERY- 2017      INDICATION: Left Tibial ORIF; S82.142A-Displaced bicondylar fracture of  left tibia, initial encounter for closed fracture; leg pain     COMPARISON: NONE     FINDINGS: 1 minute and 32 seconds of fluoroscopy and 4 images used for  fixation of a tibial plateau fracture. Please see the procedure report  for full details.       Impression:       Fluoroscopy for fixation of a tibial plateau fracture.     D:  2017  E:  2017         This report was finalized on 2017 4:13 PM by Dr." Tere De Leon MD.       XR Knee 1 or 2 View Left [[id number]] Collected:  12/01/17 1648     Updated:  12/01/17 1648    Narrative:       EXAMINATION: XR KNEE 1 OR 2 VW, LEFT-12/01/2017:      INDICATION: KNEE ARTHROPLASTY.      COMPARISON: NONE.     FINDINGS: Three views of the right knee reveal hardware seen in the  proximal tibia. Postsurgical changes seen within the soft tissues.  Brace seen aligning the left knee. Joint spaces are preserved. Cortex is  otherwise intact.       Impression:       Status post fixation of a tibial plateau fracture with  postsurgical changes seen in the soft tissues. Satisfactory alignment.     D:  12/01/2017  E:  12/01/2017                   PT:        Results Review:     I reviewed the patient's new clinical results.    Assessment and Plan     Assessment:   Status post open reduction and internal fixation left tibial plateau fracture-doing well    Principal Problem:    S/P ORIF left tibial plateau  Active Problems:    Closed fracture of left tibial plateau    Hypothyroid      Plan for disposition: Discharge to home today.  Follow-up with me in 2 weeks (12/13/2017).      Ger Hutchins MD  12/02/17  8:42 AM

## 2017-12-02 NOTE — THERAPY DISCHARGE NOTE
Acute Care - Occupational Therapy Initial Eval/Discharge  [facility]     Patient Name: Laxmi Mccartney  : 1960  MRN: [id number]  Today's Date: 2017  Onset of Illness/Injury or Date of Surgery Date: 17  Date of Referral to OT: 17  Referring Physician: Cuong      Admit Date: 2017       ICD-10-CM ICD-9-CM   1. Impaired mobility and ADLs Z74.09 799.89   2. Closed fracture of left tibial plateau, initial encounter S82.142A 823.00     Patient Active Problem List   Diagnosis   • Closed fracture of left tibial plateau   • Tibial plateau fracture, left, closed, initial encounter   • S/P ORIF left tibial plateau   • Hypothyroid     Past Medical History:   Diagnosis Date   • Abdominal pain    • Acute serous otitis media    • Acute sinusitis    • Acute suppurative otitis media    • Bronchitis    • Congestion and hemorrhage of prostate    • Cough    • Disease of thyroid gland    • Eustachian tube dysfunction     BRITTANY    • Exogenous obesity    • Fatigue    • Gastroenteritis    • Influenza    • Sneezing    • Vaginitis      Past Surgical History:   Procedure Laterality Date   • COLONOSCOPY  2009    repeat 5 yrs a   • MANDIBLE FRACTURE SURGERY            OT ASSESSMENT FLOWSHEET (last 72 hours)      OT Evaluation       17 1047 17 0940 17 0938 17 0927 17 0926    Rehab Evaluation    Document Type  evaluation;discharge summary  -LR evaluation;discharge summary  -TB      Subjective Information   no complaints;agree to therapy  -TB      Patient Effort, Rehab Treatment   good  -TB      Symptoms Noted During/After Treatment   none  -TB      General Information    Patient Profile Review   yes  -TB      Onset of Illness/Injury or Date of Surgery Date   17  -TB      Referring Physician   Cuong  -TB      General Observations   Pt rec'vd in high fowlers, room air; no family present  -TB      Pertinent History Of Current Problem   Pt s/p injury moving furniture; Imaging (+) Closed  Fx of L Tibial Plateau; s/p ORIF L Tibial Plateau  -TB      Precautions/Limitations   fall precautions;non-weight bearing status;brace on when up   NWB L LE; long brace L LE  -TB      Prior Level of Function   independent:;all household mobility;community mobility;transfer;bed mobility;ADL's;home management;driving;shopping;using stairs  -TB      Equipment Currently Used at Home   crutches  -TB  other (see comments);commode  -SP    Plans/Goals Discussed With   patient;agreed upon  -TB      Risks Reviewed   patient:;LOB;increased discomfort;lines disloged  -TB      Benefits Reviewed   patient:;improve function;increase independence;increase knowledge  -TB      Barriers to Rehab   none identified  -TB      Living Environment    Lives With   alone  -TB  alone  -SP    Living Arrangements   house  -TB  house  -SP    Home Accessibility     no concerns  -SP    Stair Railings at Home     none  -SP    Type of Financial/Environmental Concern     none  -SP    Transportation Available     car  -SP    Living Environment Comment   Pt plans to d/c home with friend and then to family members home to avoid stairs; will have walk-in shower with seat; recommend BSC - education completed  -TB      Clinical Impression    Date of Referral to OT   12/01/17  -TB      OT Diagnosis   Impaired mobility, transfer and ADL  -TB      Patient/Family Goals Statement   to d/c today  -TB      Therapy Frequency   evaluation only  -TB      Anticipated Equipment Needs At Discharge   bedside commode;rolling knee walker;two wheeled walker  -TB      Anticipated Discharge Disposition home with assist  -TB  home with assist  -TB      Functional Level Prior    Ambulation    0-->independent  -SP     Transferring    0-->independent  -SP     Toileting    0-->independent  -SP     Bathing    0-->independent  -SP     Dressing    0-->independent  -SP     Eating    0-->independent  -SP     Communication    0-->understands/communicates without difficulty  -SP      Swallowing    0-->swallows foods/liquids without difficulty  -SP     Prior Functional Level Comment   Independent  -TB      Vital Signs    O2 Delivery Post Treatment   room air  -TB      Pre Patient Position   Sitting  -TB      Intra Patient Position   Standing  -TB      Post Patient Position   Sitting  -TB      Pain Assessment    Pain Assessment   [place] FACES  -TB      [place] FACES Pain Rating   2  -TB      Pain Type   Acute pain  -TB      Pain Location   Knee  -TB      Pain Orientation   Left  -TB      Pain Intervention(s)   Ambulation/increased activity;Repositioned  -TB      Response to Interventions   Good pain control  -TB      Vision Assessment/Intervention    Visual Impairment   WFL with corrective lenses  -TB      Cognitive Assessment/Intervention    Current Cognitive/Communication Assessment   functional  -TB      Orientation Status   oriented x 4  -TB      Follows Commands/Answers Questions   100% of the time  -TB      Personal Safety   WNL/WFL  -TB      Personal Safety Interventions   fall prevention program maintained;gait belt;nonskid shoes/slippers when out of bed   NWB L LE, Brace L LE  -TB      Short/Long Term Memory   intact short term memory;intact long term memory  -TB      ROM (Range of Motion)    General ROM   no range of motion deficits identified  -TB      MMT (Manual Muscle Testing)    General MMT Assessment   no strength deficits identified  -TB      Bed Mobility, Assessment/Treatment    Bed Mobility, Scoot/Bridge, [place]   independent  -TB      Bed Mob, Supine to Sit, [place]   independent  -TB      Bed Mob, Sit to Supine, [place]   independent  -TB      Bed Mobility, Safety Issues   decreased use of legs for bridging/pushing  -TB      Transfer Assessment/Treatment    Transfers, Sit-Stand [place]   contact guard assist;verbal cues required  -TB      Transfers, Stand-Sit [place]   contact guard assist;verbal cues required  -TB      Transfers,  Sit-Stand-Sit, Assist Device   rolling walker  -TB      Toilet Transfer, [place]   supervision required  -TB      Toilet Transfer, Assistive Device   bedside commode without drop arms  -TB      Transfer, Impairments   --   NWB L LE  -TB      Transfer, Comment   pt transfering [place] [facility]  -TB      Functional Mobility    Functional Mobility- Ind. Level   contact guard assist;verbal cues required  -TB      Functional Mobility- Device   rolling walker;axillary crutches  -TB      Functional Mobility-Distance (Feet)   50  -TB      Functional Mobility- Comment   good safety, no LOB  -TB      Upper Body Dressing Assessment/Training    UB Dressing Assess/Train, [place]   independent  -TB      Lower Body Dressing Assessment/Training    LB Dressing Assess/Train, Clothing Type   donning:   Brace/DANYEL  -TB      LB Dressing Assess/Train, Position   supported sitting  -TB      LB Dressing Assess/Train, [place]   maximum assist (25% patient effort)  -TB      LB Dressing Assess/Train, Impairments   ROM decreased;pain  -TB      Toileting Assessment/Training    Toileting Assess/Train, Position   sitting  -TB      Toileting Assess/Train, Indepen Level   set up required  -TB      Balance Skills Training    Sitting-Level of Assistance   Independent  -TB      Standing-Level of Assistance   Close supervision  -TB      Therapy Exercises    Bilateral Upper Extremity   AROM:;sitting;shoulder extension/flexion;shoulder abduction/adduction;shoulder horizontal abd/add;shoulder ER/IR;elbow flexion/extension;pronation/supination;hand pumps  -TB      Fine Motor Coordination Training    Opposition   Right:;Left:;intact  -TB      Sensory Assessment/Intervention    Light Touch   LUE;RUE  -TB      LUE Light Touch   WNL  -TB      RUE Light Touch   WNL  -TB      Positioning and Restraints    Pre-Treatment Position   in bed  -TB      Post Treatment Position   bed  -TB      In Bed   fowlers;call light within reach  -TB        12/01/17 1400  12/01/17 0756             General Information    Equipment Currently Used at Home  crutches  -TH       Living Environment    Lives With  other relative(s) (specify)  -TH       Living Arrangements  house  -TH       Home Accessibility  stairs within home;stairs to enter home  -TH       Number of Stairs to Enter Home  2  -TH       Number of Stairs Within Home  12  -TH       Stair Railings at Home  inside, present on left side  -TH       Type of Financial/Environmental Concern  none  -TH       Transportation Available  car;family or friend will provide  -TH       Functional Level Prior    Ambulation 0-->independent  -SG 0-->independent  -TH       Transferring 0-->independent  -SG 0-->independent  -TH       Toileting 0-->independent  -SG 0-->independent  -TH       Bathing 0-->independent  -SG 0-->independent  -TH       Dressing 0-->independent  -SG 0-->independent  -TH       Eating 0-->independent  -SG 0-->independent  -TH       Communication 0-->understands/communicates without difficulty  -SG 0-->understands/communicates without difficulty  -TH       Swallowing 0-->swallows foods/liquids without difficulty  -SG 0-->swallows foods/liquids without difficulty  -TH         User Key  (r) = Recorded By, (t) = Taken By, (c) = Cosigned By    Initials Name Effective Dates    TB Terra Munoz, OT 06/22/15 -     LR Ciara Cortez, PT 06/19/15 -     SP Taylor Porter, RN 03/14/16 -     TH Li Webb RN 06/16/16 -     SG Kimberly Mariano RN 06/16/16 -           Occupational Therapy Education     Title: PT OT SLP Therapies (Done)     Topic: Occupational Therapy (Done)     Point: ADL training (Done)    Description: Instruct learner(s) on proper safety adaptation and remediation techniques during self care or transfers.   Instruct in proper use of assistive devices.    Learning Progress Summary    Learner Readiness Method Response Comment Documented by Status   Patient Acceptance E,D,TB RYANNE,DU Education  completed for surgical/NWB LLE precautions, transfer training, HEP, DME recommendation/BSC and home safety/fall prevention TB 12/02/17 1036 Done               Point: Precautions (Done)    Description: Instruct learner(s) on prescribed precautions during self-care and functional transfers.    Learning Progress Summary    Learner Readiness Method Response Comment Documented by Status   Patient Acceptance E,D,TB VU,DU Education completed for surgical/NWB LLE precautions, transfer training, HEP, DME recommendation/BSC and home safety/fall prevention TB 12/02/17 1036 Done                      User Key     Initials Effective Dates Name Provider Type Discipline     06/22/15 -  Terra Munoz OT Occupational Therapist OT                OT Recommendation and Plan  Anticipated Equipment Needs At Discharge: bedside commode, rolling knee walker, two wheeled walker  Anticipated Discharge Disposition: home with assist  Therapy Frequency: evaluation only  Plan of Care Review  Plan Of Care Reviewed With: patient  Outcome Summary/Follow up Plan: OT IE completed.  Pt CGA for mobility; SBA for BSC transfer. Education completed for surgical/NWB precautions, home safety/fall prevention. Pt set to d/c today. OT to d/c at this time.           OT Goals       12/02/17 1038          Patient Education OT STG    Patient Education OT STG, Time to Achieve 1 day  -TB      Patient Education OT STG, Education Type HEP;precautions per surgeon;positioning;home safety  -TB      Patient Education OT STG, Education Understanding demonstrates adequately;verbalizes understanding  -TB      Patient Education OT STG Outcome goal met  -TB        User Key  (r) = Recorded By, (t) = Taken By, (c) = Cosigned By    Initials Name Provider Type     Terra Munoz OT Occupational Therapist                Outcome Measures       12/02/17 0938          How much help from another is currently needed...    Putting on and taking off regular lower body  clothing? 3  -TB      Bathing (including washing, rinsing, and drying) 3  -TB      Toileting (which includes using toilet bed pan or urinal) 3  -TB      Putting on and taking off regular upper body clothing 4  -TB      Taking care of personal grooming (such as brushing teeth) 4  -TB      Eating meals 4  -TB      Score 21  -TB      Functional Assessment    Outcome Measure Options AM-PAC 6 Clicks Daily Activity (OT)  -TB        User Key  (r) = Recorded By, (t) = Taken By, (c) = Cosigned By    Initials Name Provider Type    TB Terra Munoz OT Occupational Therapist          Time Calculation:         Time Calculation- OT       12/02/17 1047          Time Calculation- OT    OT Start Time 0938  -TB      Total Timed Code Minutes- OT 15 minute(s)  -TB      OT Received On 12/02/17  -TB        User Key  (r) = Recorded By, (t) = Taken By, (c) = Cosigned By    Initials Name Provider Type    MOUNA Munoz OT Occupational Therapist          Therapy Charges for Today     Code Description Service Date Service Provider Modifiers Qty    [id number]  OT EVAL LOW COMPLEXITY 3 12/2/2017 Terra Munoz OT GO 1    [id number]  OT THERAPEUTIC ACT EA 15 MIN 12/2/2017 Terra Munoz OT GO 1               OT Discharge Summary  Anticipated Discharge Disposition: home with assist  Reason for Discharge: Discharge from facility  Outcomes Achieved: Able to achieve all goals within established timeline  Discharge Destination: Home with assist    Terra Munoz OT  12/2/2017

## 2017-12-02 NOTE — PROGRESS NOTES
Discharge Planning Assessment  [facility]     Patient Name: Laxmi Mccartney  MRN: [id number]  Today's Date: 12/2/2017    Admit Date: 12/1/2017          Discharge Needs Assessment       12/02/17 0926    Living Environment    Lives With alone    Living Arrangements house    Home Accessibility no concerns    Stair Railings at Home none    Type of Financial/Environmental Concern none    Transportation Available car    Living Environment    Provides Primary Care For no one    Quality Of Family Relationships supportive;helpful;involved    Able to Return to Prior Living Arrangements yes    Discharge Needs Assessment    Concerns To Be Addressed discharge planning concerns    Readmission Within The Last 30 Days no previous admission in last 30 days    Anticipated Changes Related to Illness none    Equipment Currently Used at Home other (see comments);commode    Discharge Contact Information if Applicable Messi Oneill [phone]            Discharge Plan       12/02/17 0928    Case Management/Social Work Plan    Plan home    Patient/Family In Agreement With Plan yes    Additional Comments Pt lives in [facility]. Her nephew has been staying with her. She was independent with all ADLs prior to admit. Denies use of any DME. She currently owns a transfer chair and a bedside commode. Per pt she is followed by her PCP and her medications are covered by [employer]. Her plan for discharge is to stay with her sister at discharge who reports can assit her as needed. No discharge needs at this time.        Discharge Placement     No information found                Demographic Summary       12/02/17 0927    Referral Information    Admission Type observation    Referral Source physician    Reason For Consult discharge planning    Contact Information    Permission Granted to Share Information With     Primary Care Physician Information    Name Bernabe            Functional Status       12/02/17 0927    Functional Status  Current    Current Functional Level Comment See PT eval    Functional Status Prior    Ambulation 0-->independent    Transferring 0-->independent    Toileting 0-->independent    Bathing 0-->independent    Dressing 0-->independent    Eating 0-->independent    Communication 0-->understands/communicates without difficulty    Swallowing 0-->swallows foods/liquids without difficulty    IADL    Medications independent    Meal Preparation independent    Housekeeping independent    Laundry independent    Shopping independent    Oral Care independent            Psychosocial     None            Abuse/Neglect     None            Legal     None            Substance Abuse     None            Patient Forms     None          Taylor Porter RN

## 2017-12-04 ENCOUNTER — TRANSITIONAL CARE MANAGEMENT TELEPHONE ENCOUNTER (OUTPATIENT)
Dept: INTERNAL MEDICINE | Facility: CLINIC | Age: 57
End: 2017-12-04

## 2017-12-04 NOTE — OUTREACH NOTE
I spoke directly to the patient to obtain the information for the [facility] discharge call note.The patient was admitted to [facility] 12/1/2017. Dx of tibia fracture. Dicharges to home. The patient was advised to notify PCP and seel UTC or ER eval if sx's reoccur, worsen, or condition changes unexpectedly. The patient verbalized understanding.

## 2017-12-06 ENCOUNTER — TELEPHONE (OUTPATIENT)
Dept: ORTHOPEDIC SURGERY | Facility: CLINIC | Age: 57
End: 2017-12-06

## 2017-12-06 NOTE — TELEPHONE ENCOUNTER
PLEASE CALL PATIENT IN REGARDS TO WEARING HER DANYEL HOSE. SHE HAD SURGERY LAST Friday ON HER LEFT LEG. SHE WANTS TO KNOW HOW LONG SHE NEEDS TO WEAR THEM AND SHOULD SHE WEAR THEM CONTINUALLY AND ON BOTH LEGS. SHE CAN BE REACHED AT [phone].

## 2017-12-06 NOTE — TELEPHONE ENCOUNTER
I told her she needs to wear them until she comes see him for her PO Apt.  She had been sleeping in them and I suggested she put them on in the AM and take them off to sleep.  She understood.  Michelle

## 2017-12-13 ENCOUNTER — OFFICE VISIT (OUTPATIENT)
Dept: ORTHOPEDIC SURGERY | Facility: CLINIC | Age: 57
End: 2017-12-13

## 2017-12-13 DIAGNOSIS — S82.142D CLOSED FRACTURE OF LEFT TIBIAL PLATEAU WITH ROUTINE HEALING, SUBSEQUENT ENCOUNTER: Primary | ICD-10-CM

## 2017-12-13 PROCEDURE — 99024 POSTOP FOLLOW-UP VISIT: CPT | Performed by: ORTHOPAEDIC SURGERY

## 2017-12-13 NOTE — PROGRESS NOTES
[facility] [place] [facility] Note    Subjective     Chief Complaint   Patient presents with   • Post-op Follow-up     2 weeks TIBIAL PLATEAU OPEN REDUCTION INTERNAL FIXATION  LEFT 12/01/17        HPI    Laxmi Mccartney is a 57 y.o. female.  She follows up today status post open reduction and internal fixation of her left tibial plateau fracture.  Pain is improved.  No new complaints.  She is nonweightbearing.      Patient Active Problem List   Diagnosis   • Closed fracture of left tibial plateau   • Tibial plateau fracture, left, closed, initial encounter   • S/P ORIF left tibial plateau   • Hypothyroid     Past Medical History:   Diagnosis Date   • Abdominal pain    • Acute serous otitis media    • Acute sinusitis    • Acute suppurative otitis media    • Bronchitis    • Congestion and hemorrhage of prostate    • Cough    • Disease of thyroid gland    • Eustachian tube dysfunction     BRITTANY    • Exogenous obesity    • Fatigue    • Gastroenteritis    • Influenza    • Sneezing    • Vaginitis       Past Surgical History:   Procedure Laterality Date   • COLONOSCOPY  2009    repeat 5 yrs a   • MANDIBLE FRACTURE SURGERY     • TIBIAL PLATEAU OPEN REDUCTION INTERNAL FIXATION Left 12/1/2017    Procedure: TIBIAL PLATEAU OPEN REDUCTION INTERNAL FIXATION  LEFT;  Surgeon: Ger Hutchins MD;  Location: [facility];  Service:       Family History   Problem Relation Age of Onset   • Rheum arthritis Mother    • Aortic aneurysm Father    • Pernicious anemia Father    • Aortic aneurysm Sister    • Colon cancer Paternal Aunt    • Pernicious anemia Paternal Aunt      Social History     Social History   • Marital status: Single     Spouse name: N/A   • Number of children: N/A   • Years of education: N/A     Occupational History   • Not on file.     Social History Main Topics   • Smoking status: Never Smoker   • Smokeless tobacco: Never Used   • Alcohol use Yes      Comment: socially   • Drug use: No   • Sexual activity: Defer      Other Topics Concern   • Not on file     Social History Narrative      Current Outpatient Prescriptions on File Prior to Visit   Medication Sig Dispense Refill   • aspirin  MG EC tablet Take 1 tablet by mouth Daily. 30 tablet 0   • calcium carbonate (OS-VIKTORIYA) 600 MG tablet Take 600 mg by mouth Daily.     • cyclobenzaprine (FLEXERIL) 10 MG tablet Take 1 tablet by mouth 3 (Three) Times a Day As Needed for Muscle Spasms. 12 tablet 0   • Multiple Vitamins-Minerals (HAIR/SKIN/NAILS/BIOTIN PO) Take 1 capsule by mouth.     • mupirocin (BACTROBAN) 2 % ointment Apply  topically 2 (Two) Times a Day. 22 g 0   • SYNTHROID 112 MCG tablet      • traMADol (ULTRAM) 50 MG tablet Take 1 tablet by mouth Every 6 (Six) Hours As Needed for Moderate Pain . 20 tablet 0   • HYDROcodone-acetaminophen (NORCO) 5-325 MG per tablet Take 1-2 tablets by mouth Every 6 (Six) Hours As Needed for Severe Pain . 20 tablet 0   • naproxen (NAPROSYN) 500 MG tablet Take 1 tablet by mouth 2 (Two) Times a Day As Needed for Moderate Pain . 12 tablet 0     No current facility-administered medications on file prior to visit.       No Known Allergies     Review of Systems   Constitutional: Negative.    HENT: Negative.    Eyes: Negative.    Respiratory: Negative.    Cardiovascular: Negative.    Gastrointestinal: Negative.    Endocrine: Negative.    Genitourinary: Negative.    Musculoskeletal: Positive for arthralgias.   Skin: Negative.    Allergic/Immunologic: Negative.    Neurological: Negative.    Hematological: Negative.    Psychiatric/Behavioral: Negative.         Objective      Physical Exam  There were no vitals taken for this visit.    There is no height or weight on file to calculate BMI.    General:   Mental Status:  Alert   Appearance: Cooperative, in no acute distress   Build and Nutrition: Well-nourished and well developed female   Orientation: Alert and oriented to person, place and time   Posture:  Sitting in a  wheelchair    Integument:   Left knee:   Wound is healing well with no signs of infection    Lower Extremities:   Left Knee:    Tenderness:  None    Effusion:  None    Swelling:  None    Crepitus: None    Range of motion:  Extension: 0°       Flexion: 70°  Instability: No varus laxity, no valgus laxity, negative anterior drawer  Deformities:  None      Imaging/Studies  Imaging Results (last 24 hours)     Procedure Component Value Units Date/Time    XR Knee 1 or 2 View Left [[id number]] Resulted:  12/13/17 1559     Updated:  12/13/17 1600    Narrative:       Left Knee Radiographs  Indication: left knee pain  Views: AP, lateral, views of the left knee    Comparison: Good alignment compared to previous films    Findings:   Stable left tibial plateau fracture, with no unusual features.  Status   post open reduction and internal fixation with plate and screw fixation.            Assessment and Plan     Laxmi was seen today for post-op follow-up.    Diagnoses and all orders for this visit:    Closed fracture of left tibial plateau with routine healing, subsequent encounter  -     XR Knee 1 or 2 View Left         I reviewed my findings with the patient today.  She is doing well following her open reduction and internal fixation of the left tibial plateau fracture.  I will see her back in 4 weeks with repeat x-rays, but sooner for any problems.  More than likely, we will begin progressive weightbearing at that point.  She may work on range of motion now, and progress to full as tolerated.    Return in about 4 weeks (around 1/10/2018) for Recheck with X-Rays.        Ger Hutchins MD  12/13/17  7:26 PM

## 2018-01-10 ENCOUNTER — OFFICE VISIT (OUTPATIENT)
Dept: ORTHOPEDIC SURGERY | Facility: CLINIC | Age: 58
End: 2018-01-10

## 2018-01-10 DIAGNOSIS — S82.142D CLOSED FRACTURE OF LEFT TIBIAL PLATEAU WITH ROUTINE HEALING, SUBSEQUENT ENCOUNTER: Primary | ICD-10-CM

## 2018-01-10 PROCEDURE — 99024 POSTOP FOLLOW-UP VISIT: CPT | Performed by: ORTHOPAEDIC SURGERY

## 2018-01-10 NOTE — PROGRESS NOTES
[facility] [place] [facility] Note    Subjective     Chief Complaint   Patient presents with   • Post-op Follow-up     5 weeks TIBIAL PLATEAU OPEN REDUCTION INTERNAL FIXATION  LEFT 12/01/17        HPI    Laxmi Mccartney is a 57 y.o. female. She follows up today status post open reduction internal fixation of her left tibial plateau fracture.  She's doing well today.  No complaints other than stiffness.      Patient Active Problem List   Diagnosis   • Closed fracture of left tibial plateau   • Tibial plateau fracture, left, closed, initial encounter   • S/P ORIF left tibial plateau   • Hypothyroid     Past Medical History:   Diagnosis Date   • Abdominal pain    • Acute serous otitis media    • Acute sinusitis    • Acute suppurative otitis media    • Bronchitis    • Congestion and hemorrhage of prostate    • Cough    • Disease of thyroid gland    • Eustachian tube dysfunction     BRITTANY    • Exogenous obesity    • Fatigue    • Gastroenteritis    • Influenza    • Sneezing    • Vaginitis       Past Surgical History:   Procedure Laterality Date   • COLONOSCOPY  2009    repeat 5 yrs a   • MANDIBLE FRACTURE SURGERY     • TIBIAL PLATEAU OPEN REDUCTION INTERNAL FIXATION Left 12/1/2017    Procedure: TIBIAL PLATEAU OPEN REDUCTION INTERNAL FIXATION  LEFT;  Surgeon: Ger Hutchins MD;  Location: [facility];  Service:       Family History   Problem Relation Age of Onset   • Rheum arthritis Mother    • Aortic aneurysm Father    • Pernicious anemia Father    • Aortic aneurysm Sister    • Colon cancer Paternal Aunt    • Pernicious anemia Paternal Aunt      Social History     Social History   • Marital status: Single     Spouse name: N/A   • Number of children: N/A   • Years of education: N/A     Occupational History   • Not on file.     Social History Main Topics   • Smoking status: Never Smoker   • Smokeless tobacco: Never Used   • Alcohol use Yes      Comment: socially   • Drug use: No   • Sexual activity: Defer     Other  Topics Concern   • Not on file     Social History Narrative      Current Outpatient Prescriptions on File Prior to Visit   Medication Sig Dispense Refill   • aspirin  MG EC tablet Take 1 tablet by mouth Daily. 30 tablet 0   • calcium carbonate (OS-VIKTORIYA) 600 MG tablet Take 600 mg by mouth Daily.     • cyclobenzaprine (FLEXERIL) 10 MG tablet Take 1 tablet by mouth 3 (Three) Times a Day As Needed for Muscle Spasms. 12 tablet 0   • HYDROcodone-acetaminophen (NORCO) 5-325 MG per tablet Take 1-2 tablets by mouth Every 6 (Six) Hours As Needed for Severe Pain . 20 tablet 0   • Multiple Vitamins-Minerals (HAIR/SKIN/NAILS/BIOTIN PO) Take 1 capsule by mouth.     • mupirocin (BACTROBAN) 2 % ointment Apply  topically 2 (Two) Times a Day. 22 g 0   • naproxen (NAPROSYN) 500 MG tablet Take 1 tablet by mouth 2 (Two) Times a Day As Needed for Moderate Pain . 12 tablet 0   • SYNTHROID 112 MCG tablet      • traMADol (ULTRAM) 50 MG tablet Take 1 tablet by mouth Every 6 (Six) Hours As Needed for Moderate Pain . 20 tablet 0     No current facility-administered medications on file prior to visit.       No Known Allergies     Review of Systems   Constitutional: Negative for activity change, appetite change, chills, diaphoresis, fatigue, fever and unexpected weight change.   HENT: Negative for congestion, dental problem, drooling, ear discharge, ear pain, facial swelling, hearing loss, mouth sores, nosebleeds, postnasal drip, rhinorrhea, sinus pressure, sneezing, sore throat, tinnitus, trouble swallowing and voice change.    Eyes: Negative for photophobia, pain, discharge, redness, itching and visual disturbance.   Respiratory: Negative for apnea, cough, choking, chest tightness, shortness of breath, wheezing and stridor.    Cardiovascular: Negative for chest pain, palpitations and leg swelling.   Gastrointestinal: Negative for abdominal distention, abdominal pain, anal bleeding, blood in stool, constipation, diarrhea, nausea, rectal  pain and vomiting.   Endocrine: Negative for cold intolerance, heat intolerance, polydipsia, polyphagia and polyuria.   Genitourinary: Negative for decreased urine volume, difficulty urinating, dysuria, enuresis, flank pain, frequency, genital sores, hematuria and urgency.   Musculoskeletal: Negative for arthralgias, back pain, gait problem, joint swelling, myalgias, neck pain and neck stiffness.        S/p left knee surgery   Skin: Negative for color change, pallor, rash and wound.   Allergic/Immunologic: Negative for environmental allergies, food allergies and immunocompromised state.   Neurological: Negative for dizziness, tremors, seizures, syncope, facial asymmetry, speech difficulty, weakness, light-headedness, numbness and headaches.   Hematological: Negative for adenopathy. Does not bruise/bleed easily.   Psychiatric/Behavioral: Negative for agitation, behavioral problems, confusion, decreased concentration, dysphoric mood, hallucinations, self-injury, sleep disturbance and suicidal ideas. The patient is not nervous/anxious and is not hyperactive.         Objective      Physical Exam  There were no vitals taken for this visit.    There is no height or weight on file to calculate BMI.    General:   Mental Status:  Alert   Appearance: Cooperative, in no acute distress   Build and Nutrition: Well-nourished and well developed female   Orientation: Alert and oriented to person, place and time   Posture: Sitting in a wheelchair    Integument:   Left knee: Wound is well-healed with no signs of infection    Lower Extremities:   Left Knee:    Tenderness:  None    Effusion:  None    Swelling: None    Crepitus:  None    Range of motion:  Extension: 0°       Flexion: 90°  Instability:  No varus laxity, no valgus laxity, negative anterior drawer  Deformities:  None        Imaging/Studies  Imaging Results (last 24 hours)     Procedure Component Value Units Date/Time    XR Knee 1 or 2 View Left [[id number]] Resulted:   01/10/18 1559     Updated:  01/10/18 1600    Narrative:       Left Knee Radiographs  Indication: left knee pain  Views: AP, lateral views of the left knee    Comparison: 12/13/2017    Findings:   Fracture is well aligned, with no signs of hardware loosening or failure.    Tibial plateau fracture appears to be in good position.            Assessment and Plan     Laxmi was seen today for post-op follow-up.    Diagnoses and all orders for this visit:    Closed fracture of left tibial plateau with routine healing, subsequent encounter  -     XR Knee 1 or 2 View Left  -     Ambulatory Referral to Physical Therapy Evaluate and treat        I reviewed my findings with patient today.  Her left knee continues to heal well, and the radiographs show good alignment.  She may begin weightbearing in a progressive fashion, with partial weightbearing for the first 2 weeks, followed by full weightbearing with the brace on over the ensuing 2 weeks.  I will see her back in 4 weeks with x-rays, but sooner for any problems.    Return in about 4 weeks (around 2/7/2018) for Recheck with X-Rays.          Ger Hutchins MD  01/10/18  4:09 PM

## 2018-01-18 ENCOUNTER — TELEPHONE (OUTPATIENT)
Dept: ORTHOPEDIC SURGERY | Facility: CLINIC | Age: 58
End: 2018-01-18

## 2018-01-18 NOTE — TELEPHONE ENCOUNTER
Called patient. Gave her the information concerning WB and 0-90 ROM. She understood and will call back with any further problems or questions.     Christen

## 2018-02-07 ENCOUNTER — OFFICE VISIT (OUTPATIENT)
Dept: ORTHOPEDIC SURGERY | Facility: CLINIC | Age: 58
End: 2018-02-07

## 2018-02-07 DIAGNOSIS — S82.142D CLOSED FRACTURE OF LEFT TIBIAL PLATEAU WITH ROUTINE HEALING, SUBSEQUENT ENCOUNTER: Primary | ICD-10-CM

## 2018-02-07 PROCEDURE — 99024 POSTOP FOLLOW-UP VISIT: CPT | Performed by: ORTHOPAEDIC SURGERY

## 2018-02-07 NOTE — PROGRESS NOTES
[facility] [place] [facility] Note    Subjective     Chief Complaint   Patient presents with   • Post-op     10 weeks s/p (L) Tibial Plateau ORIF 12/1/17        HPI    Laxmi Mccartney is a 58 y.o. female. She follows up today for her left tibial plateau fracture.  She's doing well today.  Ambulatory with out external aids, although she does have a cane just for protection.      Patient Active Problem List   Diagnosis   • Closed fracture of left tibial plateau   • Tibial plateau fracture, left, closed, initial encounter   • S/P ORIF left tibial plateau   • Hypothyroid     Past Medical History:   Diagnosis Date   • Abdominal pain    • Acute serous otitis media    • Acute sinusitis    • Acute suppurative otitis media    • Bronchitis    • Congestion and hemorrhage of prostate    • Cough    • Disease of thyroid gland    • Eustachian tube dysfunction     BRITTANY    • Exogenous obesity    • Fatigue    • Gastroenteritis    • Influenza    • Sneezing    • Vaginitis       Past Surgical History:   Procedure Laterality Date   • COLONOSCOPY  2009    repeat 5 yrs a   • MANDIBLE FRACTURE SURGERY     • TIBIAL PLATEAU OPEN REDUCTION INTERNAL FIXATION Left 12/1/2017    Procedure: TIBIAL PLATEAU OPEN REDUCTION INTERNAL FIXATION  LEFT;  Surgeon: Ger Hutchins MD;  Location: [facility];  Service:       Family History   Problem Relation Age of Onset   • Rheum arthritis Mother    • Aortic aneurysm Father    • Pernicious anemia Father    • Aortic aneurysm Sister    • Colon cancer Paternal Aunt    • Pernicious anemia Paternal Aunt      Social History     Social History   • Marital status: Single     Spouse name: N/A   • Number of children: N/A   • Years of education: N/A     Occupational History   • Not on file.     Social History Main Topics   • Smoking status: Never Smoker   • Smokeless tobacco: Never Used   • Alcohol use Yes      Comment: socially   • Drug use: No   • Sexual activity: Defer     Other Topics Concern   • Not on file      Social History Narrative      Current Outpatient Prescriptions on File Prior to Visit   Medication Sig Dispense Refill   • aspirin  MG EC tablet Take 1 tablet by mouth Daily. 30 tablet 0   • calcium carbonate (OS-VIKTORIYA) 600 MG tablet Take 600 mg by mouth Daily.     • cyclobenzaprine (FLEXERIL) 10 MG tablet Take 1 tablet by mouth 3 (Three) Times a Day As Needed for Muscle Spasms. 12 tablet 0   • HYDROcodone-acetaminophen (NORCO) 5-325 MG per tablet Take 1-2 tablets by mouth Every 6 (Six) Hours As Needed for Severe Pain . 20 tablet 0   • Multiple Vitamins-Minerals (HAIR/SKIN/NAILS/BIOTIN PO) Take 1 capsule by mouth.     • mupirocin (BACTROBAN) 2 % ointment Apply  topically 2 (Two) Times a Day. 22 g 0   • naproxen (NAPROSYN) 500 MG tablet Take 1 tablet by mouth 2 (Two) Times a Day As Needed for Moderate Pain . 12 tablet 0   • SYNTHROID 112 MCG tablet      • traMADol (ULTRAM) 50 MG tablet Take 1 tablet by mouth Every 6 (Six) Hours As Needed for Moderate Pain . 20 tablet 0     No current facility-administered medications on file prior to visit.       No Known Allergies     Review of Systems   Constitutional: Negative for activity change, appetite change, chills, diaphoresis, fatigue, fever and unexpected weight change.   HENT: Negative for congestion, dental problem, drooling, ear discharge, ear pain, facial swelling, hearing loss, mouth sores, nosebleeds, postnasal drip, rhinorrhea, sinus pressure, sneezing, sore throat, tinnitus, trouble swallowing and voice change.    Eyes: Negative for photophobia, pain, discharge, redness, itching and visual disturbance.   Respiratory: Negative for apnea, cough, choking, chest tightness, shortness of breath, wheezing and stridor.    Cardiovascular: Negative for chest pain, palpitations and leg swelling.   Gastrointestinal: Negative for abdominal distention, abdominal pain, anal bleeding, blood in stool, constipation, diarrhea, nausea, rectal pain and vomiting.   Endocrine:  Negative for cold intolerance, heat intolerance, polydipsia, polyphagia and polyuria.   Genitourinary: Negative for decreased urine volume, difficulty urinating, dysuria, enuresis, flank pain, frequency, genital sores, hematuria and urgency.   Musculoskeletal: Positive for joint swelling. Negative for arthralgias, back pain, gait problem, myalgias, neck pain and neck stiffness.        Mild Joint Pain    Skin: Negative for color change, pallor, rash and wound.   Allergic/Immunologic: Negative for environmental allergies, food allergies and immunocompromised state.   Neurological: Negative for dizziness, tremors, seizures, syncope, facial asymmetry, speech difficulty, weakness, light-headedness, numbness and headaches.   Hematological: Negative for adenopathy. Does not bruise/bleed easily.   Psychiatric/Behavioral: Negative for agitation, behavioral problems, confusion, decreased concentration, dysphoric mood, hallucinations, self-injury, sleep disturbance and suicidal ideas. The patient is not nervous/anxious and is not hyperactive.         Objective      Physical Exam  There were no vitals taken for this visit.    There is no height or weight on file to calculate BMI.    General:   Mental Status:  Alert   Appearance: Cooperative, in no acute distress   Build and Nutrition: Well-nourished and well developed female   Orientation: Alert and oriented to person, place and time   Posture: Normal   Gait: Normal    Integument:   Left knee: Wound is well-healed with no signs of infection    Lower Extremities:   Left Knee:    Tenderness:  None    Effusion:  None    Swelling: None    Crepitus:  None    Range of motion:  Extension: 0°       Flexion: 120°  Instability:  No varus laxity, no valgus laxity, negative anterior drawer  Deformities:  None      Imaging/Studies  Imaging Results (last 24 hours)     Procedure Component Value Units Date/Time    XR Knee 1 or 2 View Left [[id number]] Resulted:  02/07/18 1046     Updated:   02/07/18 1047    Narrative:       Left Knee Radiographs  Indication: left knee pain  Views: AP, lateral views of the left knee    Comparison: 1/10/2018    Findings:   Good alignment of the fracture seen, with no signs of hardware loosening   or failure.  The tibial plateau fracture appears to be healed.            Assessment and Plan     Laxmi was seen today for post-op.    Diagnoses and all orders for this visit:    Closed fracture of left tibial plateau with routine healing, subsequent encounter  -     XR Knee 1 or 2 View Left        Reviewed my findings with patient today.  Her left lateral tibial plateau fracture is well-healed, and she may discontinue the brace, and I will see her back in 2 months with a repeat x-ray.  I will see her back sooner for any problems.    Return in about 2 months (around 4/7/2018) for Recheck with X-Rays.          Ger Hutchins MD  02/07/18  10:53 AM

## 2018-04-11 ENCOUNTER — OFFICE VISIT (OUTPATIENT)
Dept: ORTHOPEDIC SURGERY | Facility: CLINIC | Age: 58
End: 2018-04-11

## 2018-04-11 VITALS
SYSTOLIC BLOOD PRESSURE: 130 MMHG | WEIGHT: 166.67 LBS | HEART RATE: 69 BPM | BODY MASS INDEX: 29.53 KG/M2 | DIASTOLIC BLOOD PRESSURE: 86 MMHG | HEIGHT: 63 IN

## 2018-04-11 DIAGNOSIS — S82.142D CLOSED FRACTURE OF LEFT TIBIAL PLATEAU WITH ROUTINE HEALING, SUBSEQUENT ENCOUNTER: Primary | ICD-10-CM

## 2018-04-11 PROCEDURE — 99213 OFFICE O/P EST LOW 20 MIN: CPT | Performed by: ORTHOPAEDIC SURGERY

## 2018-04-11 RX ORDER — LEVOTHYROXINE SODIUM 100 MCG
TABLET ORAL
COMMUNITY
Start: 2018-04-08 | End: 2023-03-29

## 2018-04-11 NOTE — PROGRESS NOTES
[facility] [place] [facility] Note    Subjective     Chief Complaint   Patient presents with   • Left Knee - Follow-up     2 month follow up, 4 months status post: Left Tibial Plateau ORIF 12/1/17            HPI    Laxmi Mccartney is a 58 y.o. female. She follows up today for her left lateral tibial plateau fracture.  She's doing well today.  Fully ambulatory without external aids.  Overall she is markedly improved.  Only mild ache on occasion.      Patient Active Problem List   Diagnosis   • Closed fracture of left tibial plateau   • Tibial plateau fracture, left, closed, initial encounter   • S/P ORIF left tibial plateau   • Hypothyroid     Past Medical History:   Diagnosis Date   • Abdominal pain    • Acute serous otitis media    • Acute sinusitis    • Acute suppurative otitis media    • Bronchitis    • Congestion and hemorrhage of prostate    • Cough    • Disease of thyroid gland    • Eustachian tube dysfunction     BRITTANY    • Exogenous obesity    • Fatigue    • Gastroenteritis    • Influenza    • Sneezing    • Vaginitis       Past Surgical History:   Procedure Laterality Date   • COLONOSCOPY  2009    repeat 5 yrs a   • MANDIBLE FRACTURE SURGERY     • TIBIAL PLATEAU OPEN REDUCTION INTERNAL FIXATION Left 12/1/2017    Procedure: TIBIAL PLATEAU OPEN REDUCTION INTERNAL FIXATION  LEFT;  Surgeon: Ger Hutchins MD;  Location: [facility];  Service:       Family History   Problem Relation Age of Onset   • Rheum arthritis Mother    • Aortic aneurysm Father    • Pernicious anemia Father    • Aortic aneurysm Sister    • Colon cancer Paternal Aunt    • Pernicious anemia Paternal Aunt      Social History     Social History   • Marital status: Single     Spouse name: N/A   • Number of children: N/A   • Years of education: N/A     Occupational History   • Not on file.     Social History Main Topics   • Smoking status: Never Smoker   • Smokeless tobacco: Never Used   • Alcohol use Yes      Comment: socially   • Drug use:  No   • Sexual activity: Defer     Other Topics Concern   • Not on file     Social History Narrative   • No narrative on file      Current Outpatient Prescriptions on File Prior to Visit   Medication Sig Dispense Refill   • naproxen (NAPROSYN) 500 MG tablet Take 1 tablet by mouth 2 (Two) Times a Day As Needed for Moderate Pain . 12 tablet 0   • SYNTHROID 112 MCG tablet      • calcium carbonate (OS-VIKTORIYA) 600 MG tablet Take 600 mg by mouth Daily.     • cyclobenzaprine (FLEXERIL) 10 MG tablet Take 1 tablet by mouth 3 (Three) Times a Day As Needed for Muscle Spasms. 12 tablet 0   • Multiple Vitamins-Minerals (HAIR/SKIN/NAILS/BIOTIN PO) Take 1 capsule by mouth.     • [DISCONTINUED] aspirin  MG EC tablet Take 1 tablet by mouth Daily. 30 tablet 0   • [DISCONTINUED] HYDROcodone-acetaminophen (NORCO) 5-325 MG per tablet Take 1-2 tablets by mouth Every 6 (Six) Hours As Needed for Severe Pain . 20 tablet 0   • [DISCONTINUED] mupirocin (BACTROBAN) 2 % ointment Apply  topically 2 (Two) Times a Day. 22 g 0   • [DISCONTINUED] traMADol (ULTRAM) 50 MG tablet Take 1 tablet by mouth Every 6 (Six) Hours As Needed for Moderate Pain . 20 tablet 0     No current facility-administered medications on file prior to visit.       No Known Allergies     Review of Systems   Constitutional: Negative for activity change, appetite change, chills, diaphoresis, fatigue, fever and unexpected weight change.   HENT: Negative for congestion, dental problem, drooling, ear discharge, ear pain, facial swelling, hearing loss, mouth sores, nosebleeds, postnasal drip, rhinorrhea, sinus pressure, sneezing, sore throat, tinnitus, trouble swallowing and voice change.    Eyes: Negative for photophobia, pain, discharge, redness, itching and visual disturbance.   Respiratory: Negative for apnea, cough, choking, chest tightness, shortness of breath, wheezing and stridor.    Cardiovascular: Negative for chest pain, palpitations and leg swelling.  "  Gastrointestinal: Negative for abdominal distention, abdominal pain, anal bleeding, blood in stool, constipation, diarrhea, nausea, rectal pain and vomiting.   Endocrine: Negative for cold intolerance, heat intolerance, polydipsia, polyphagia and polyuria.   Genitourinary: Negative for decreased urine volume, difficulty urinating, dysuria, enuresis, flank pain, frequency, genital sores, hematuria and urgency.   Musculoskeletal: Positive for arthralgias. Negative for back pain, gait problem, joint swelling, myalgias, neck pain and neck stiffness.   Skin: Negative for color change, pallor, rash and wound.   Allergic/Immunologic: Negative for environmental allergies, food allergies and immunocompromised state.   Neurological: Negative for dizziness, tremors, seizures, syncope, facial asymmetry, speech difficulty, weakness, light-headedness, numbness and headaches.   Hematological: Negative for adenopathy. Does not bruise/bleed easily.   Psychiatric/Behavioral: Negative for agitation, behavioral problems, confusion, decreased concentration, dysphoric mood, hallucinations, self-injury, sleep disturbance and suicidal ideas. The patient is not nervous/anxious and is not hyperactive.         Objective      Physical Exam  /86   Pulse 69   Ht 160 cm (62.99\")   Wt 75.6 kg (166 lb 10.7 oz)   BMI 29.53 kg/m²     Body mass index is 29.53 kg/m².    General:   Mental Status:  Alert   Appearance: Cooperative, in no acute distress   Build and Nutrition: Well-nourished and well developed female   Orientation: Alert and oriented to person, place and time   Posture: Normal   Gait: Normal    Integument:   Left knee: Wound is well-healed with no signs of infection    Lower Extremities:   Left Knee:    Tenderness:  None    Effusion:  None    Swelling: None    Crepitus:  None    Range of motion:  Extension: 0°       Flexion: 130°  Instability:  No varus laxity, no valgus laxity, negative anterior drawer  Deformities: "  None      Imaging/Studies  Imaging Results (last 24 hours)     Procedure Component Value Units Date/Time    XR Knee 1 or 2 View Left [[id number]] Resulted:  04/11/18 1149     Updated:  04/11/18 1150    Narrative:       Left Knee Radiographs  Indication: left knee pain  Views: AP, lateral, views of the left knee    Comparison: 2/7/2018    Findings:   No signs of hardware loosening or failure, with good alignment of the   fracture, with no subsidence at the fracture site in particular.            Assessment and Plan     Laxmi was seen today for follow-up.    Diagnoses and all orders for this visit:    Closed fracture of left tibial plateau with routine healing, subsequent encounter  -     XR Knee 1 or 2 View Left        I reviewed my findings with patient today.  Her left lateral tibial plateau fracture appears to be well-healed.  I'll see her back in 6 months with a repeat x-ray, but sooner for any problems.    Return in about 6 months (around 10/11/2018).      Medical Decision Making  Data/Risk: radiology tests and independent visualization of imaging, lab tests, or EMG/NCV      Ger Hutchins MD  04/11/18  11:52 AM

## 2019-01-18 ENCOUNTER — TRANSCRIBE ORDERS (OUTPATIENT)
Dept: ADMINISTRATIVE | Facility: HOSPITAL | Age: 59
End: 2019-01-18

## 2019-01-18 DIAGNOSIS — Z12.31 VISIT FOR SCREENING MAMMOGRAM: Primary | ICD-10-CM

## 2019-03-04 ENCOUNTER — HOSPITAL ENCOUNTER (OUTPATIENT)
Dept: MAMMOGRAPHY | Facility: HOSPITAL | Age: 59
Discharge: HOME | End: 2019-03-04
Attending: FAMILY MEDICINE | Admitting: FAMILY MEDICINE

## 2019-03-04 DIAGNOSIS — Z12.31 VISIT FOR SCREENING MAMMOGRAM: ICD-10-CM

## 2019-03-04 PROCEDURE — 77067 SCR MAMMO BI INCL CAD: CPT

## 2019-03-04 PROCEDURE — 77063 BREAST TOMOSYNTHESIS BI: CPT

## 2019-03-04 PROCEDURE — 77063 BREAST TOMOSYNTHESIS BI: CPT | Performed by: RADIOLOGY

## 2019-03-04 PROCEDURE — 77067 SCR MAMMO BI INCL CAD: CPT | Performed by: RADIOLOGY

## 2019-03-18 ENCOUNTER — HOSPITAL ENCOUNTER (OUTPATIENT)
Dept: MAMMOGRAPHY | Facility: HOSPITAL | Age: 59
Discharge: HOME | End: 2019-03-18
Admitting: RADIOLOGY

## 2019-03-18 DIAGNOSIS — R92.8 ABNORMAL MAMMOGRAM: ICD-10-CM

## 2019-03-18 PROCEDURE — G0279 TOMOSYNTHESIS, MAMMO: HCPCS

## 2019-03-18 PROCEDURE — 77061 BREAST TOMOSYNTHESIS UNI: CPT | Performed by: RADIOLOGY

## 2019-03-18 PROCEDURE — 77065 DX MAMMO INCL CAD UNI: CPT

## 2019-03-18 PROCEDURE — 77065 DX MAMMO INCL CAD UNI: CPT | Performed by: RADIOLOGY

## 2020-05-21 LAB
ESTRADIOL SERPL-MCNC: 84.9 PG/ML
TESTOST FREE SERPL-MCNC: 1.7 PG/ML (ref 0–4.2)
TESTOST SERPL-MCNC: 10 NG/DL (ref 3–41)

## 2020-06-01 ENCOUNTER — OFFICE VISIT (OUTPATIENT)
Dept: ORTHOPEDIC SURGERY | Facility: CLINIC | Age: 60
End: 2020-06-01

## 2020-06-01 VITALS — BODY MASS INDEX: 29.94 KG/M2 | WEIGHT: 169 LBS | HEART RATE: 82 BPM | OXYGEN SATURATION: 98 %

## 2020-06-01 DIAGNOSIS — M25.562 LEFT KNEE PAIN, UNSPECIFIED CHRONICITY: Primary | ICD-10-CM

## 2020-06-01 DIAGNOSIS — Z98.890 HISTORY OF OPEN REDUCTION AND INTERNAL FIXATION (ORIF) PROCEDURE: ICD-10-CM

## 2020-06-01 DIAGNOSIS — M17.5 OTHER SECONDARY OSTEOARTHRITIS OF LEFT KNEE: ICD-10-CM

## 2020-06-01 PROCEDURE — 99213 OFFICE O/P EST LOW 20 MIN: CPT | Performed by: PHYSICIAN ASSISTANT

## 2020-06-01 NOTE — PROGRESS NOTES
[facility] [place] Orthopa[facility] Surgery Clinic Note        Subjective     CC: Follow-up (2 year follow up; 3.5 years status post: Left Tibial Plateau ORIF 12/1/17)      LISET Mccartney is a 60 y.o. female.  Patient presents today for evaluation of her left knee.  On the above-stated date she underwent ORIF for left lateral tibial plateau fracture by Dr. Hutchins.  She states in the last 3 weeks she has been increasing exercise and walking and now notes a tightness to the knee and inability to fully squat.  She is just concerned that she might have done something to her knee.    Currently she endorses a pain scale of 2-3/10.  Severity the pain mild.  Quality the pain dull, aching with a tightness.  Symptoms are worse with walking.  Associated symptoms intermittent swelling and stiffness along with some occasional giving away sensation.  No mechanical locking symptoms to the knee such as locking or catching.  Patient denies any numbness or tingling into the extremity.  She has been taking ibuprofen and icing the knee for treatment.    ROS:    Constiutional:Pt denies fever, chills, nausea, or vomiting.  MSK:as above        Objective      Past Medical History  Past Medical History:   Diagnosis Date   • Abdominal pain    • Acute serous otitis media    • Acute sinusitis    • Acute suppurative otitis media    • Bronchitis    • Congestion and hemorrhage of prostate    • Cough    • Disease of thyroid gland    • Eustachian tube dysfunction     BRITTANY    • Exogenous obesity    • Fatigue    • Gastroenteritis    • Influenza    • Sneezing    • Vaginitis          Physical Exam  Pulse 82   Wt 76.7 kg (169 lb)   SpO2 98%   BMI 29.94 kg/m²     Body mass index is 29.94 kg/m².    Patient is well nourished and well developed.        Ortho Exam  Integument:   Left knee/proximal tibia: Wound is well-healed with no signs of infection    Lower Extremities:   Left knee:    Tenderness:  Mild discomfort superior aspect of the patella.  Negative joint  line tenderness    Effusion:  Trace    Swelling: None    Crepitus:  None    Range of motion:  Extension: 0°       Flexion: 120°  Instability:  No varus laxity, no valgus laxity, negative anterior drawer  Deformities:  None      Imaging/Labs/EMG Reviewed:  Ordered left knee plain films.  Imaging read by Dr. Hutchins.    Left Knee Radiographs  Indication: left knee pain  Views: Standing AP's and skiers of both knees, with lateral and sunrise views of the left knee     Comparison: Nonweightbearing films from 4/11/2018     Findings:   Mild degenerative changes, with joint space narrowing both medially and laterally, patellofemoral degeneration, with mild peaking of the tibial spines, with good healing of the previous lateral tibial plateau fracture, which is well aligned, with no signs of hardware loosening or failure.      Assessment:  1. Left knee pain, unspecified chronicity    2. Other secondary osteoarthritis of left knee    3. History of open reduction and internal fixation (ORIF) procedure        Plan:  Left knee pain, osteoarthritis in the setting of a history of ORIF lateral tibial plateau fracture 2017--knee is stable.  Offered aspiration and injection of the knee which patient declined.  Patient will continue taking ibuprofen but 600 mg 3 times daily for the next couple of weeks.  Avoid prolonged walking and impact activities.  We discussed biking, swimming and elliptical for exercise.  Follow-up 2 weeks for repeat evaluation, sooner if issues arise.  Depending on how she is doing she may consider aspiration and injection at that time.  Questions and concerns answered.    Patient was also examined by Dr. Hutchins and he agrees with the above assessment and plan.      Janina Berger PA-C  06/04/20  08:39

## 2020-06-16 ENCOUNTER — OFFICE VISIT (OUTPATIENT)
Dept: ORTHOPEDIC SURGERY | Facility: CLINIC | Age: 60
End: 2020-06-16

## 2020-06-16 VITALS — WEIGHT: 169 LBS | BODY MASS INDEX: 29.95 KG/M2 | HEART RATE: 75 BPM | OXYGEN SATURATION: 99 % | HEIGHT: 63 IN

## 2020-06-16 DIAGNOSIS — M17.12 PRIMARY OSTEOARTHRITIS OF LEFT KNEE: Primary | ICD-10-CM

## 2020-06-16 PROCEDURE — 20610 DRAIN/INJ JOINT/BURSA W/O US: CPT | Performed by: PHYSICIAN ASSISTANT

## 2020-06-16 RX ORDER — TRIAMCINOLONE ACETONIDE 40 MG/ML
40 INJECTION, SUSPENSION INTRA-ARTICULAR; INTRAMUSCULAR
Status: COMPLETED | OUTPATIENT
Start: 2020-06-16 | End: 2020-06-16

## 2020-06-16 RX ORDER — LIDOCAINE HYDROCHLORIDE 10 MG/ML
4 INJECTION, SOLUTION EPIDURAL; INFILTRATION; INTRACAUDAL; PERINEURAL
Status: COMPLETED | OUTPATIENT
Start: 2020-06-16 | End: 2020-06-16

## 2020-06-16 RX ADMIN — LIDOCAINE HYDROCHLORIDE 4 ML: 10 INJECTION, SOLUTION EPIDURAL; INFILTRATION; INTRACAUDAL; PERINEURAL at 10:57

## 2020-06-16 RX ADMIN — TRIAMCINOLONE ACETONIDE 40 MG: 40 INJECTION, SUSPENSION INTRA-ARTICULAR; INTRAMUSCULAR at 10:57

## 2020-06-16 NOTE — PROGRESS NOTES
"    [facility] [place] [facility] [place]        Subjective     CC: Follow-up (2 weeks follow up for Left knee pain, unspecified chronicity )      HPI    Laxmi Mccartney is a 60 y.o. female.  Patient returns today for follow-up evaluation of her left knee.  She does note improvement since she is been taking ibuprofen.  She still has increased pain with prolonged standing.  The swelling has improved.    At this time she endorses a pain scale of 3-4/10.      ROS:    Constiutional:Pt denies fever, chills, nausea, or vomiting.  MSK:as above        Objective      Past Medical History  Past Medical History:   Diagnosis Date   • Abdominal pain    • Acute serous otitis media    • Acute sinusitis    • Acute suppurative otitis media    • Bronchitis    • Congestion and hemorrhage of prostate    • Cough    • Disease of thyroid gland    • Eustachian tube dysfunction     BRITTANY    • Exogenous obesity    • Fatigue    • Gastroenteritis    • Influenza    • Sneezing    • Vaginitis          Physical Exam  Pulse 75   Ht 160 cm (62.99\")   Wt 76.7 kg (169 lb)   SpO2 99%   BMI 29.94 kg/m²     Body mass index is 29.94 kg/m².    Patient is well nourished and well developed.        Ortho Exam  Left knee  Skin: Grossly intact without any redness, warmth or swelling.    Tenderness: Still notes pain predominantly to the superior aspect of the patella and deep inside with tightness appreciable on range of motion, particularly at end range of flexion.  Motion: 0-120  Motor/sensory: Grossly intact L2-S1.      Imaging/Labs/EMG Reviewed:  No new imaging today.      Assessment:  1. Primary osteoarthritis of left knee        Plan:  1. Osteoarthritis left knee--at her last appointment we discussed proceeding with corticosteroid injection.  She wishes to have the injection today.  2. Corticosteroid injection was given intra-articularly today.  3. Patient to continue use of ibuprofen as needed.  4. Would still avoid higher impact activities as well as " prolonged walking.  5. Follow-up 2 months for repeat evaluation, sooner if issues arise or symptoms worsen/change.  6. Questions and concerns answered.    After discussing the risks, benefits, indications of injection, the patient gave consent to proceed.  Her left knee was confirmed as the correct joint to be injected with a timeout.  It was then prepped using Hibiclens and injected with a mixture of 4 cc of 1% plain lidocaine and 1 cc of Kenalog (40 mg per mL), without any resistance through the anterior lateral approach, patient in seated position.  Area was cleaned, hemostasis was achieved and a Band-Aid was applied over the injection site.  The patient tolerated procedure well.  I instructed the patient on signs and symptoms of infection.  They should report to the emergency department or return to clinic if any of these develop, for further evaluation and treatment.  Recommended modifying activity for the next 48 hours to include rest, ice, elevation and oral pain medication as needed.  Patient was observed ambulating normally after the injection.      Janina Berger PA-C  06/16/20  13:26

## 2020-06-16 NOTE — PROGRESS NOTES
Procedure   Large Joint Arthrocentesis: L knee  Date/Time: 6/16/2020 10:57 AM  Consent given by: patient  Site marked: site marked  Timeout: Immediately prior to procedure a time out was called to verify the correct patient, procedure, equipment, support staff and site/side marked as required   Supporting Documentation  Indications: pain   Procedure Details  Location: knee - L knee  Preparation: Patient was prepped and draped in the usual sterile fashion  Needle size: 22 G  Approach: anterolateral  Medications administered: 4 mL lidocaine PF 1% 1 %; 40 mg triamcinolone acetonide 40 MG/ML  Patient tolerance: patient tolerated the procedure well with no immediate complications

## 2020-11-06 ENCOUNTER — LAB (OUTPATIENT)
Dept: LAB | Facility: HOSPITAL | Age: 60
End: 2020-11-06

## 2020-11-06 ENCOUNTER — TRANSCRIBE ORDERS (OUTPATIENT)
Dept: LAB | Facility: HOSPITAL | Age: 60
End: 2020-11-06

## 2020-11-06 DIAGNOSIS — N95.1 SYMPTOMATIC MENOPAUSAL OR FEMALE CLIMACTERIC STATES: Primary | ICD-10-CM

## 2020-11-06 DIAGNOSIS — N95.1 SYMPTOMATIC MENOPAUSAL OR FEMALE CLIMACTERIC STATES: ICD-10-CM

## 2020-11-06 DIAGNOSIS — Z79.899 ENCOUNTER FOR LONG-TERM (CURRENT) USE OF OTHER MEDICATIONS: ICD-10-CM

## 2020-11-06 LAB — ESTRADIOL SERPL HS-MCNC: 116 PG/ML

## 2020-11-06 PROCEDURE — 36415 COLL VENOUS BLD VENIPUNCTURE: CPT

## 2020-11-06 PROCEDURE — 84402 ASSAY OF FREE TESTOSTERONE: CPT

## 2020-11-06 PROCEDURE — 82670 ASSAY OF TOTAL ESTRADIOL: CPT

## 2020-11-06 PROCEDURE — 84403 ASSAY OF TOTAL TESTOSTERONE: CPT

## 2020-11-09 LAB
TESTOST FREE SERPL-MCNC: 1.7 PG/ML (ref 0–4.2)
TESTOST SERPL-MCNC: 30 NG/DL (ref 3–41)

## 2021-02-02 ENCOUNTER — TRANSCRIBE ORDERS (OUTPATIENT)
Dept: LAB | Facility: HOSPITAL | Age: 61
End: 2021-02-02

## 2021-02-02 ENCOUNTER — LAB (OUTPATIENT)
Dept: LAB | Facility: HOSPITAL | Age: 61
End: 2021-02-02

## 2021-02-02 DIAGNOSIS — N95.1 SYMPTOMATIC MENOPAUSAL OR FEMALE CLIMACTERIC STATES: ICD-10-CM

## 2021-02-02 DIAGNOSIS — N95.1 SYMPTOMATIC MENOPAUSAL OR FEMALE CLIMACTERIC STATES: Primary | ICD-10-CM

## 2021-02-02 DIAGNOSIS — Z79.890 NEED FOR PROPHYLACTIC HORMONE REPLACEMENT THERAPY (POSTMENOPAUSAL): ICD-10-CM

## 2021-02-02 LAB
ESTRADIOL SERPL HS-MCNC: 137 PG/ML
TESTOST SERPL-MCNC: 29.6 NG/DL (ref 2.9–40.8)

## 2021-02-02 PROCEDURE — 82670 ASSAY OF TOTAL ESTRADIOL: CPT

## 2021-02-02 PROCEDURE — 84403 ASSAY OF TOTAL TESTOSTERONE: CPT

## 2021-02-02 PROCEDURE — 36415 COLL VENOUS BLD VENIPUNCTURE: CPT

## 2021-02-02 PROCEDURE — 84402 ASSAY OF FREE TESTOSTERONE: CPT

## 2021-02-06 LAB — TESTOST FREE SERPL-MCNC: 1.7 PG/ML (ref 0–4.2)

## 2021-05-03 ENCOUNTER — TRANSCRIBE ORDERS (OUTPATIENT)
Dept: LAB | Facility: HOSPITAL | Age: 61
End: 2021-05-03

## 2021-05-03 ENCOUNTER — LAB (OUTPATIENT)
Dept: LAB | Facility: HOSPITAL | Age: 61
End: 2021-05-03

## 2021-05-03 DIAGNOSIS — N95.1 SYMPTOMATIC MENOPAUSAL OR FEMALE CLIMACTERIC STATES: ICD-10-CM

## 2021-05-03 DIAGNOSIS — Z79.890 NEED FOR PROPHYLACTIC HORMONE REPLACEMENT THERAPY (POSTMENOPAUSAL): ICD-10-CM

## 2021-05-03 DIAGNOSIS — N95.1 SYMPTOMATIC MENOPAUSAL OR FEMALE CLIMACTERIC STATES: Primary | ICD-10-CM

## 2021-05-03 LAB
ESTRADIOL SERPL HS-MCNC: 170 PG/ML
FSH SERPL-ACNC: 4.88 MIU/ML
TESTOST SERPL-MCNC: 24.4 NG/DL (ref 2.9–40.8)

## 2021-05-03 PROCEDURE — 84402 ASSAY OF FREE TESTOSTERONE: CPT

## 2021-05-03 PROCEDURE — 84403 ASSAY OF TOTAL TESTOSTERONE: CPT

## 2021-05-03 PROCEDURE — 83001 ASSAY OF GONADOTROPIN (FSH): CPT

## 2021-05-03 PROCEDURE — 82670 ASSAY OF TOTAL ESTRADIOL: CPT

## 2021-05-03 PROCEDURE — 36415 COLL VENOUS BLD VENIPUNCTURE: CPT

## 2021-05-07 LAB — TESTOST FREE SERPL-MCNC: 0.9 PG/ML (ref 0–4.2)

## 2022-03-14 ENCOUNTER — TRANSCRIBE ORDERS (OUTPATIENT)
Dept: ADMINISTRATIVE | Facility: HOSPITAL | Age: 62
End: 2022-03-14

## 2022-03-14 DIAGNOSIS — Z12.31 VISIT FOR SCREENING MAMMOGRAM: Primary | ICD-10-CM

## 2022-09-19 ENCOUNTER — TRANSCRIBE ORDERS (OUTPATIENT)
Dept: ADMINISTRATIVE | Facility: HOSPITAL | Age: 62
End: 2022-09-19

## 2022-09-19 DIAGNOSIS — Z12.31 ENCOUNTER FOR SCREENING MAMMOGRAM FOR MALIGNANT NEOPLASM OF BREAST: Primary | ICD-10-CM

## 2023-03-29 ENCOUNTER — OFFICE VISIT (OUTPATIENT)
Dept: ENDOCRINOLOGY | Facility: CLINIC | Age: 63
End: 2023-03-29
Payer: MEDICARE

## 2023-03-29 VITALS
OXYGEN SATURATION: 94 % | WEIGHT: 176 LBS | BODY MASS INDEX: 31.18 KG/M2 | SYSTOLIC BLOOD PRESSURE: 124 MMHG | HEIGHT: 63 IN | HEART RATE: 74 BPM | DIASTOLIC BLOOD PRESSURE: 78 MMHG

## 2023-03-29 DIAGNOSIS — E03.9 HYPOTHYROIDISM, UNSPECIFIED TYPE: Primary | ICD-10-CM

## 2023-03-29 PROCEDURE — 99203 OFFICE O/P NEW LOW 30 MIN: CPT | Performed by: INTERNAL MEDICINE

## 2023-03-29 NOTE — PROGRESS NOTES
Chief Complaint   Patient presents with   • Hypothyroidism        New patient who is being seen in consultation regarding hypothyroidism at the request of No ref. provider found     HPI   Laxmi Mccartney is a 63 y.o. female who presents for evaluation of hypothyroidism.    Patient presents today for evaluation of hypothyroidsim which was diagnosed 10-12 when patient presented with symptoms of fatigue and weight gain. She was started on Synthroid at a dose of 100-112 mcg daily. She is currently taking levothyroxine 112 mcg daily and has been taking this dose for 3 months.  She does report that she ran out of medication in early January which had a significant impact on her energy level, this is improved somewhat but is not back to baseline.    Patient denies palpiltations, anxiety.  Patient denies changes in bowel habits.   Patient reports hair loss which has improved.  Patient reports decreased energy level.    Patient does report that she has taken [place] Thyroid in the past and states she felt well on this medication.  Patient also reports that she had repeat labs at the [facility] earlier this week.    Past Medical History:   Diagnosis Date   • Abdominal pain    • Acute serous otitis media    • Acute sinusitis    • Acute suppurative otitis media    • Bronchitis    • Congestion and hemorrhage of prostate    • Cough    • Disease of thyroid gland    • Eustachian tube dysfunction     BRITTANY    • Exogenous obesity    • Fatigue    • Gastroenteritis    • Hypothyroidism    • Influenza    • Sneezing    • Vaginitis      Past Surgical History:   Procedure Laterality Date   • COLONOSCOPY  2009    repeat 5 yrs a   • MANDIBLE FRACTURE SURGERY     • TIBIA FRACTURE SURGERY     • TIBIAL PLATEAU OPEN REDUCTION INTERNAL FIXATION Left 12/01/2017    Procedure: TIBIAL PLATEAU OPEN REDUCTION INTERNAL FIXATION  LEFT;  Surgeon: Ger Hutchins MD;  [facility]  Service:       Family History   Problem Relation Age of Onset  "  • Rheum arthritis Mother    • Aortic aneurysm Father    • Pernicious anemia Father    • Hypertension Sister    • Aortic aneurysm Sister    • Macular degeneration Sister    • Hypertension Sister    • Other Sister    • Hypertension Sister    • Rheum arthritis Sister    • Macular degeneration Sister    • Pernicious anemia Sister    • Hypertension Brother    • Diabetes Brother    • Cancer Paternal Aunt    • Colon cancer Paternal Aunt    • Pernicious anemia Paternal Aunt    • Breast cancer Neg Hx    • Ovarian cancer Neg Hx       Social History     Socioeconomic History   • Marital status: Single   Tobacco Use   • Smoking status: Never   • Smokeless tobacco: Never   Vaping Use   • Vaping Use: Never used   Substance and Sexual Activity   • Alcohol use: Yes     Comment: socially   • Drug use: No   • Sexual activity: Defer      No Known Allergies   Current Outpatient Medications on File Prior to Visit   Medication Sig Dispense Refill   • calcium carbonate (OS-VIKTORIYA) 600 MG tablet Take 1 tablet by mouth Daily.     • progesterone (PROMETRIUM) 100 MG capsule      • SYNTHROID 112 MCG tablet      • [DISCONTINUED] Multiple Vitamins-Minerals (HAIR/SKIN/NAILS/BIOTIN PO) Take 1 tablet by mouth.     • [DISCONTINUED] SYNTHROID 100 MCG tablet        No current facility-administered medications on file prior to visit.        Review of Systems   Constitutional: Positive for activity change and chills.   HENT: Positive for mouth sores, sinus pressure, sneezing, tinnitus and trouble swallowing.    Eyes: Positive for photophobia.   Musculoskeletal: Positive for back pain.   Neurological: Positive for headache.   Psychiatric/Behavioral: Positive for sleep disturbance.        Vitals:    03/29/23 1001   BP: 124/78   BP Location: Left arm   Patient Position: Sitting   Cuff Size: Adult   Pulse: 74   SpO2: 94%   Weight: 79.8 kg (176 lb)   Height: 160 cm (63\")   Body mass index is 31.18 kg/m².     Physical Exam  Vitals reviewed.   Constitutional:  "      General: She is not in acute distress.     Appearance: Normal appearance.   Neck:      Thyroid: No thyromegaly.   Cardiovascular:      Rate and Rhythm: Normal rate and regular rhythm.   Pulmonary:      Effort: Pulmonary effort is normal.      Breath sounds: Normal breath sounds.   Abdominal:      General: Bowel sounds are normal.      Palpations: Abdomen is soft.      Tenderness: There is no abdominal tenderness.   Neurological:      General: No focal deficit present.      Mental Status: She is alert.   Psychiatric:         Mood and Affect: Mood and affect normal.         Behavior: Behavior is cooperative.        Labs/Imaging  No recent labs available for review, except that requested from patient's PCP    Assessment and Plan    Diagnoses and all orders for this visit:    1. Hypothyroidism, unspecified type (Primary)  Patient is currently taking Synthroid 112 mcg daily, this dose has been stable over the past 3 months.  She did miss the medication in early January but denies recent missed doses.  Patient reports concerns regarding fatigue and hair loss which are improved since restarting medication but not fully resolved.  She did recently complete labs which were not available for review, these have been requested.  Discussed TSH goal for treatment with thyroid hormone.  Reviewed potential for transition to alternative means of thyroid hormone replacement including addition of synthetic T3 or transition to desiccated thyroid hormone which patient has done well on in the past.  We discussed potential adverse effects of T3-containing therapy including anxiety, palpitations.  Patient denies any history of cardiovascular abnormalities or coronary artery disease.  We will determine next steps after review of labs.       Return in about 4 months (around 7/29/2023) for Next scheduled follow up. The patient was instructed to contact the clinic with any interval questions or concerns.    Stephy Colindres MD     Dictated  Utilizing Dragon Dictation

## 2023-04-03 ENCOUNTER — TELEPHONE (OUTPATIENT)
Dept: ENDOCRINOLOGY | Facility: CLINIC | Age: 63
End: 2023-04-03
Payer: MEDICARE

## 2023-04-03 NOTE — TELEPHONE ENCOUNTER
Pt called to say she had recent labs drawn at Dr Jean [phone] I called to get these results -they are going to fax then to us-she wanted to know what Dr Colindres thinks of them  pts number [phone]

## 2023-04-05 ENCOUNTER — TELEPHONE (OUTPATIENT)
Dept: ENDOCRINOLOGY | Facility: CLINIC | Age: 63
End: 2023-04-05
Payer: MEDICARE

## 2023-04-05 NOTE — TELEPHONE ENCOUNTER
Please contact patient, I received copy of labs dated 3/21/2023    TSH 10.3    Increased TSH indicates need for dose increase in thyroid hormone.  If she wants to stay on Synthroid, I would recommend increasing dose to 137 mcg daily. (This is her approximately weight based dose).  We also do have the option of adding synthetic T3 (in the setting you would continue Synthroid 112 mcg daily and we will add liothyronine 5 mcg daily) or transitioning to [place] Thyroid.    Generally I would recommend titration Synthroid to normalize TSH but I am comfortable with any of the 3 above options.  Please let me know how patient would like to proceed.

## 2023-04-06 ENCOUNTER — TELEPHONE (OUTPATIENT)
Dept: ENDOCRINOLOGY | Facility: CLINIC | Age: 63
End: 2023-04-06

## 2023-04-06 DIAGNOSIS — E03.9 HYPOTHYROIDISM, UNSPECIFIED TYPE: Primary | ICD-10-CM

## 2023-04-06 RX ORDER — LEVOTHYROXINE SODIUM 137 MCG
137 TABLET ORAL DAILY
Qty: 30 TABLET | Refills: 3 | Status: SHIPPED | OUTPATIENT
Start: 2023-04-06

## 2023-04-06 NOTE — TELEPHONE ENCOUNTER
Spoke with patient and she voiced understanding. She agreed to titrate Synthroid.     She wants the rx to be sent to [facility] on [address].

## 2023-08-11 RX ORDER — LEVOTHYROXINE SODIUM 137 UG/1
TABLET ORAL
Qty: 90 TABLET | Refills: 0 | Status: SHIPPED | OUTPATIENT
Start: 2023-08-11

## 2023-08-11 NOTE — TELEPHONE ENCOUNTER
Rx Refill Note    Requested Prescriptions     Pending Prescriptions Disp Refills    levothyroxine (SYNTHROID, LEVOTHROID) 137 MCG tablet [Pharmacy Med Name: LEVOTHYROXINE 137 MCG TABLET] 90 tablet 1     Sig: TAKE 1 TABLET BY MOUTH DAILY. ON EMPTY STOMACH        Last office visit with prescribing clinician: 3/29/2023       Next office visit with prescribing clinician: 9/5/2023     {    Belinda Trejo MA  08/11/23, 09:15 EDT

## 2023-11-10 RX ORDER — LEVOTHYROXINE SODIUM 137 UG/1
TABLET ORAL
Qty: 90 TABLET | Refills: 0 | Status: SHIPPED | OUTPATIENT
Start: 2023-11-10

## 2023-11-10 NOTE — TELEPHONE ENCOUNTER
Rx Refill Note    Requested Prescriptions     Pending Prescriptions Disp Refills    levothyroxine (SYNTHROID, LEVOTHROID) 137 MCG tablet [Pharmacy Med Name: LEVOTHYROXINE 137 MCG TABLET] 90 tablet 0     Sig: TAKE 1 TABLET BY MOUTH DAILY. ON EMPTY STOMACH        Last office visit with prescribing clinician: 3/29/2023       Next office visit with prescribing clinician: Visit date not found     {    Belinda Trejo MA  11/10/23, 09:09 EST

## 2024-01-11 RX ORDER — SODIUM, POTASSIUM,MAG SULFATES 17.5-3.13G
2 SOLUTION, RECONSTITUTED, ORAL ORAL TAKE AS DIRECTED
Qty: 354 ML | Refills: 0 | Status: SHIPPED | OUTPATIENT
Start: 2024-01-11

## 2024-01-24 ENCOUNTER — TELEPHONE (OUTPATIENT)
Dept: GASTROENTEROLOGY | Facility: CLINIC | Age: 64
End: 2024-01-24
Payer: MEDICARE

## 2024-01-24 NOTE — TELEPHONE ENCOUNTER
Hub staff attempted to follow warm transfer process and was unsuccessful     Caller: Laxmi Mccartney    Relationship to patient: Self    Best call back number: [phone]    Patient is needing: PATIENT CALLED IN AND STATED THAT SHE HAD SOME QUESTIONS IN REGARD TO HER PREP FOR HER UPCOMING PROCEDURE SCHEDULED FOR 01/25/2024. PATIENT WOULD LIKE A CALL BACK AS SOON AS POSSIBLE BECAUSE SHE IS SUPPOSED TO START HER PREP AT 5P.

## 2024-01-25 ENCOUNTER — OUTSIDE FACILITY SERVICE (OUTPATIENT)
Dept: GASTROENTEROLOGY | Facility: CLINIC | Age: 64
End: 2024-01-25
Payer: MEDICARE

## 2024-01-25 PROCEDURE — 88305 TISSUE EXAM BY PATHOLOGIST: CPT | Performed by: INTERNAL MEDICINE

## 2024-01-25 PROCEDURE — 45385 COLONOSCOPY W/LESION REMOVAL: CPT | Performed by: INTERNAL MEDICINE

## 2024-01-26 ENCOUNTER — LAB REQUISITION (OUTPATIENT)
Dept: LAB | Facility: HOSPITAL | Age: 64
End: 2024-01-26
Payer: MEDICARE

## 2024-01-26 DIAGNOSIS — Z86.010 PERSONAL HISTORY OF COLONIC POLYPS: ICD-10-CM

## 2024-01-26 DIAGNOSIS — K57.30 DIVERTICULOSIS OF LARGE INTESTINE WITHOUT PERFORATION OR ABSCESS WITHOUT BLEEDING: ICD-10-CM

## 2024-01-26 DIAGNOSIS — Z12.11 ENCOUNTER FOR SCREENING FOR MALIGNANT NEOPLASM OF COLON: ICD-10-CM

## 2024-01-26 DIAGNOSIS — D12.3 BENIGN NEOPLASM OF TRANSVERSE COLON: ICD-10-CM

## 2024-01-29 LAB — REF LAB TEST METHOD: NORMAL

## (undated) DEVICE — PK EXTREM LOWR 10

## (undated) DEVICE — CVR HNDL LT SURG ACCSSRY BLU STRL

## (undated) DEVICE — AIRWY 90MM NO9

## (undated) DEVICE — ENCORE® LATEX MICRO SIZE 8.5, STERILE LATEX POWDER-FREE SURGICAL GLOVE: Brand: ENCORE

## (undated) DEVICE — 3M™ STERI-DRAPE™ INSTRUMENT POUCH 1018: Brand: STERI-DRAPE™

## (undated) DEVICE — SCRW LK VA/LCP S/TAP STRDRV 3.5X70MM
Type: IMPLANTABLE DEVICE | Site: TIBIA | Status: NON-FUNCTIONAL
Removed: 2017-12-01

## (undated) DEVICE — Device

## (undated) DEVICE — ANTIBACTERIAL UNDYED BRAIDED (POLYGLACTIN 910), SYNTHETIC ABSORBABLE SUTURE: Brand: COATED VICRYL

## (undated) DEVICE — BRACE KN XACT/ROM TELESCP ADJ UNIV

## (undated) DEVICE — CANNULA,OXY,ADULT,SUPERSOFT,W/7'TUB,UC: Brand: MEDLINE

## (undated) DEVICE — SYS SKIN CLS DERMABOND PRINEO W/22CM MESH TP

## (undated) DEVICE — UNDERCAST PADDING: Brand: DEROYAL

## (undated) DEVICE — GOWN,REINF,POLY,ECL,PP SLV,3XL,XLONG: Brand: MEDLINE

## (undated) DEVICE — DRAPE,TOP,102X53,STERILE: Brand: MEDLINE

## (undated) DEVICE — BIT DRL QC DIA 2.5X110MM

## (undated) DEVICE — BNDG ELAS W/CLIP 6IN 10YD LF STRL

## (undated) DEVICE — SYR LL TP 10ML STRL

## (undated) DEVICE — GW THRD SPADE PT NO/COLR 1.6X150MM

## (undated) DEVICE — SNAP KOVER: Brand: UNBRANDED

## (undated) DEVICE — 3M™ STERI-DRAPE™ U-DRAPE 1015: Brand: STERI-DRAPE™

## (undated) DEVICE — ADAPT ST INFUS ADMIN SYR 70IN

## (undated) DEVICE — GLV SURG SENSICARE W/ALOE PF LF 9 STRL